# Patient Record
Sex: FEMALE | Race: WHITE | NOT HISPANIC OR LATINO | URBAN - METROPOLITAN AREA
[De-identification: names, ages, dates, MRNs, and addresses within clinical notes are randomized per-mention and may not be internally consistent; named-entity substitution may affect disease eponyms.]

---

## 2023-07-20 LAB
ABO GROUP (TYPE) IN BLOOD: NORMAL
ANTIBODY SCREEN: NORMAL
DEHYDROEPIANDROSTERONE SULFATE (DHEA-S) (UG/DL) IN SER/: 200 UG/DL (ref 12–379)
ESTIMATED AVERAGE GLUCOSE FOR HBA1C: 85 MG/DL
HEMOGLOBIN A1C/HEMOGLOBIN TOTAL IN BLOOD: 4.6 %
HEPATITIS B VIRUS SURFACE AG PRESENCE IN SERUM: NONREACTIVE
HEPATITIS C VIRUS AB PRESENCE IN SERUM: NONREACTIVE
HIV 1/ 2 AG/AB SCREEN: NONREACTIVE
PROGESTERONE (NG/ML) IN SER/PLAS: 0.3 NG/ML
PROLACTIN (UG/L) IN SER/PLAS: 19.6 UG/L (ref 3–20)
RH FACTOR: NORMAL
RUBELLA VIRUS IGG AB: POSITIVE
SYPHILIS TOTAL AB: NONREACTIVE
THYROTROPIN (MIU/L) IN SER/PLAS BY DETECTION LIMIT <= 0.05 MIU/L: 5.3 MIU/L (ref 0.44–3.98)
THYROXINE (T4) FREE (NG/DL) IN SER/PLAS: 0.68 NG/DL (ref 0.61–1.12)
VARICELLA ZOSTER IGG: POSITIVE

## 2023-07-21 LAB
CHLAMYDIA TRACH., AMPLIFIED: NEGATIVE
N. GONORRHEA, AMPLIFIED: NEGATIVE

## 2023-07-24 LAB
17-HYDROXYPROGESTERONE (REFLAB): 65.5 NG/DL
ANTI-MULLERIAN HORMONE (AMH): 8.25 NG/ML (ref 0.18–11.71)

## 2023-07-25 LAB
TESTOSTERONE FREE (CHAN): 5.4 PG/ML (ref 0.1–6.4)
TESTOSTERONE,TOTAL,LC-MS/MS: 39 NG/DL (ref 2–45)

## 2023-07-28 LAB
THYROPEROXIDASE AB (IU/ML) IN SER/PLAS: <28 IU/ML
THYROTROPIN (MIU/L) IN SER/PLAS BY DETECTION LIMIT <= 0.05 MIU/L: 4.28 MIU/L (ref 0.44–3.98)
THYROXINE (T4) FREE (NG/DL) IN SER/PLAS: 0.65 NG/DL (ref 0.61–1.12)

## 2023-08-23 PROBLEM — R53.82 CHRONIC FATIGUE: Status: ACTIVE | Noted: 2023-08-23

## 2023-08-23 PROBLEM — E03.8 SUBCLINICAL HYPOTHYROIDISM: Status: ACTIVE | Noted: 2023-08-23

## 2023-08-23 PROBLEM — R42 VERTIGO: Status: ACTIVE | Noted: 2023-08-23

## 2023-08-23 PROBLEM — N92.6 IRREGULAR MENSES: Status: ACTIVE | Noted: 2023-08-23

## 2023-08-23 PROBLEM — R11.10 INTERMITTENT VOMITING: Status: ACTIVE | Noted: 2023-08-23

## 2023-08-23 PROBLEM — N92.6 MISSED MENSES: Status: ACTIVE | Noted: 2023-08-23

## 2023-08-23 PROBLEM — N97.9 INFERTILITY, FEMALE: Status: ACTIVE | Noted: 2023-08-23

## 2023-08-23 PROBLEM — R55 PRE-SYNCOPE: Status: ACTIVE | Noted: 2023-08-23

## 2023-08-23 PROBLEM — R87.612 LOW GRADE SQUAMOUS INTRAEPITHELIAL LESION (LGSIL) ON CERVICAL PAP SMEAR: Status: ACTIVE | Noted: 2023-08-23

## 2023-08-23 RX ORDER — LEVOTHYROXINE SODIUM 50 UG/1
1 TABLET ORAL DAILY
COMMUNITY
Start: 2023-07-31 | End: 2023-12-04 | Stop reason: SDUPTHER

## 2023-08-28 ENCOUNTER — APPOINTMENT (OUTPATIENT)
Dept: PRIMARY CARE | Facility: CLINIC | Age: 33
End: 2023-08-28
Payer: COMMERCIAL

## 2023-08-31 LAB — THYROTROPIN (MIU/L) IN SER/PLAS BY DETECTION LIMIT <= 0.05 MIU/L: 1.63 MIU/L (ref 0.44–3.98)

## 2023-10-02 ENCOUNTER — DOCUMENTATION (OUTPATIENT)
Dept: ENDOCRINOLOGY | Facility: CLINIC | Age: 33
End: 2023-10-02
Payer: COMMERCIAL

## 2023-10-02 ENCOUNTER — TELEPHONE (OUTPATIENT)
Dept: ENDOCRINOLOGY | Facility: CLINIC | Age: 33
End: 2023-10-02
Payer: COMMERCIAL

## 2023-10-02 NOTE — TELEPHONE ENCOUNTER
Arvind Mullins, she called Genetics  Friday as well, and I told them they will need to tell her to schedule an appointment for me to go over the results with them as it is not simple. It can be a phone appointment though.

## 2023-10-02 NOTE — PROGRESS NOTES
Called pt via telephone. Advised pt partner's [employer] Carrier screening results are resulted. Partner Forrest is a carrier for [id number] Alport Syndrome, Biotinidase Deficiency, and Familial Dysautonomia. Pt Sugey is a carrier for [id number] Alport Syndrome. Advised pt need follow up with Genetic Counselor to understand risk for Offspring. Both carriers for a type of Alport syndrome, although not the same type may be at increased risk. Need genetic counseling to understand risk. Briefly discussed option for IVF with  PGT-M  if pt and partner are concerned with risk after genetic counseling. Advised pt DO NOT recommend conception attempts until genetic counseling is complete so that pt and partner understand risk. Pt verbalized understanding. Pt has apt with genetics this Friday, will follow up after meeting with Genetics.   MICHEAL CASH on 10/2/23 at 2:20 PM.

## 2023-10-06 ENCOUNTER — CLINICAL SUPPORT (OUTPATIENT)
Dept: GENETICS | Facility: CLINIC | Age: 33
End: 2023-10-06
Payer: COMMERCIAL

## 2023-10-06 ENCOUNTER — TELEPHONE (OUTPATIENT)
Dept: ENDOCRINOLOGY | Facility: CLINIC | Age: 33
End: 2023-10-06

## 2023-10-06 DIAGNOSIS — Z14.8 CARRIER OF GENETIC DISORDER: ICD-10-CM

## 2023-10-06 DIAGNOSIS — Z71.2 ENCOUNTER TO DISCUSS TEST RESULTS: ICD-10-CM

## 2023-10-06 PROCEDURE — 98968 PH1 ASSMT&MGMT NQHP 21-30: CPT | Performed by: GENETIC COUNSELOR, MS

## 2023-10-06 NOTE — PROGRESS NOTES
"Sugey Childers is a 33 year old, G0 female who was initially referred for preconception genetic counseling as she was recently identified as a carrier of Alport syndrome. Her , Forrest Childers, underwent carrier screening and was also identified as a carrier of this condition. Due to these results, genetic counseling was performed to discuss these results and their reproductive implications in more detail. Genetic counseling was provided via telephone. Her , Forrest Childers, participated on the call. Time spent: 57 minutes.        PAST HISTORY:  Ms. 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 Patient herself denies any personal history of hypertension, hematuria, proteinuria, or hearing loss. She states she has hypothyroidism and PCOS.    Ms. 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     Patient  -personal history of hypothyroidism, PCOS  -nephew (sister's son) with recent hospitalization due to hematuria (\"peeing blood\")   -father with history of kidney stones, retinal detachment and cataract surgery  -maternal uncle with kidney stones  -paternal first cousin once removed with possible intellectual disability/uses wheelchair    Patient's , Forrest  -Age 33  -paternal first cousin with Tuberous Sclerosis  -father with prostate cancer    The remainder of " the family history was negative for birth defects, intellectual disability, recurrent pregnancy loss, or recognized inherited conditions. Consanguinity denied.    SELF REPORTED RACE/ETHNICITY:  Patient: Gibraltarian, Lithuanian, , English  Patient's partner:      COUNSELING:    The following information was discussed with the couple today:    1. We reviewed that the couple had expanded carrier screening for 176 primarily autosomal recessive conditions. PEDRO LUIS was identified as a carrier of 1 condition (COL4A4 related Alport syndrome); while ANNA was identified as a carrier of 3 conditions (COL4A3 related Alport syndrome, biotinidase deficiency, and familial dysautonomia). The couple screened negative for all other conditions.  Negative screening significantly reduces, but does not eliminate risk to be a carrier of any one condition. Carriers generally do not have symptoms, unless otherwise specified. Other family members are at risk to also be carriers of these conditions.     2. We discussed that COL4A4 and COL4A3 related Alport syndrome is traditionally described as being inherited in an autosomal recessive fashion; however some individuals with a single COL4A4 or COL4A3 mutation will be symptomatic and can develop renal failure; therefore autosomal dominant inheritance has been described as well. There is no known association with the type of variant (missense, nonsense, splice-cite, etc) and mode of inheritance. Clinical features of autosomal recessive Alport syndrome include hematuria and proteinuria that develop in childhood that progresses to renal insufficiency and end-stage renal disease typically by the age of 30. Progressive sensorineural hearing loss (SNHL) is usually present by late childhood or early adolescence. Ocular findings include anterior lenticonus (which is virtually pathognomonic), maculopathy (whitish or yellowish flecks or granulations in the perimacular region), corneal  "endothelial vesicles (posterior polymorphous dystrophy), and recurrent corneal erosion. In individuals with autosomal dominant forms, some individuals may be asymptomatic, while others have hematuria, proteinuria, and ESRD may occur in later adulthood. SNHL is relatively late in onset, and ocular involvement is rare.     3. We previously discussed that specific COL4A4 mutation Mrs. Dasilva carries is not a confirmed pathogenic mutation and is reported by [employer] as \"likely to have a negative impact on gene function\". There is only one entry in [employer] regarding this mutation from 2017 classifying it as a variant of uncertain significance. We discussed suspicion that this may be a pathogenic mutation. As it is not commonly reported, it is uncertain if this mutation in the heterozygous state is associated with symptoms of Alport syndrome. Patient may be at risk for symptoms of the disorder. Symptomatic individuals who become pregnant are also at increased risk of increased proteinuria, renal insufficiency, worsening hypertension, and preeclampsia. For this reason, baseline and annual blood pressure screening, urinalysis to screen for hematuria/proteinuria, and renal function assessment is recommended for her.     4. We discussed that the specific COL4A3 mutation that Mr. Dasilva carries has been reported in the literature in individuals that are affected by Focal segmental glomerulosclerosis as well as Alport syndrome. Most of these affected individuals have compound heterozygous mutation in COL4A3 (a mutation in each copy of their COL4A3 gene) however some affected individuals have a single mutation in COL4A3 (consistent with autosomal dominant inheritance) and others have a single mutation in both COL4A3 and COL4A4 suggesting digenic inheritance. Therefore Mr. Dasilva himself may also be at risk of symptoms of Alport syndrome, and baseline and annual blood pressure screening, urinalysis to screen for " hematuria/proteinuria, and renal function assessment is recommended for him as well.      4.  And Mrs. Dasilva were both informed that due to reports of affected individuals with Alport syndrome that have digenic inheritance of COL4A4 and COL4A3 mutations a conception that inherits Mrs. Dasilva' COL4A4 mutation and Mr. Dasilva' COL4A3 mutation may be at risk of childhood onset Alport syndrome. Risk of a conception inheriting both of these mutation is 25%. We also discussed the 50% risk that any conception would have a single COL4A3 or COL4A4 mutation and still potentially be at risk of milder or later onset symptoms of autosomal dominant Alport syndrome. Lastly, there would be a 25% chance of any conception inheriting neither of these mutations. In summary, up to 75% of conceptions may be at risk of some form of Alport syndrome.     5. Several reproductive options may be considered for couples who are identified as being at risk of having a conception/child with a specific genetic disorder. We specifically discussed that preimplantation genetic testing for monogenic disease (PGT-M) may be considered via in vitro fertilization. If this testing were pursued, carrier testing of the couple's parents would likely be recommended, and DNA samples from multiple family members, including the couple themselves requested for probe development. We also discussed that as up to 75% of embryos may carry at least one Alport mutation, the couple may need to determine if they are open to transferring embryos that are at risk for autosomal dominant Alport syndrome (have a single mutation) as opposed to embryos that inherit both Alport mutations and are potentially at risk of earlier onset and more severe disease. We would also have to discuss with the reporting laboratory if they would report which embryos have a single v. 2 mutations. Other reproductive options could include utilizing donor egg, sperm, or embryo, spontaneous  conception with prenatal diagnostic testing, or adoption.     6. We also discussed the option, benefits, and limitations of preimplantation aneuploidy screening (PGT-A) if in vitro fertilization is pursued. This screening is designed to identify chromosomally normal (euploid) embryos which are most likely to result in ongoing pregnancy; whereas chromosomally abnormal embryos (aneuploid) are less likely to implant or result in ongoing pregnancy and may result in miscarriage, stillbirth, or liveborn with aneuploidy. Mosaicism for aneuploidy may also be detected, in which case these embryos may still be considered for transfer as they have the potential to also result in healthy ongoing pregnancies. Depending on the laboratory performing the testing, accuracy is estimated to be 98% with a resolution of 5-10MB. and does not detect all forms of chromosome abnormalities. Per [employer], rate of euploid embryos for women <35 years old is approximately 55%. If BOTH PGT-A and PGT-M are selected, this would significantly reduce the number of embryos available for transfer; however is expected to increase the odds of ongoing pregnancy without Alport syndrome with fewer transfers.     7. We also discussed that Mr. Dasilva was identified as a carrier of biotinidase deficiency as well as familial dysautonomia. The clinical features and autosomal recessive nature of these disorders were reviewed. As Mrs. Dasilva screened negative for these conditions, risk for this couple to have an affected child with biotinidase deficiency is estimated to be 1 in 54,000 and familial dysautonomia 1 in 200,000. Biotinidase deficiency is included in [place]  screening.     8. Family history risk assessment:  A. We previously discussed patient's nephews history of hematuria in the context of her carrier test results. Given his symptoms he is now scheduled for a consult with Genetics at [facility] in February for potential  Alport testing (family lives in [place]).   B. In the absence of a known genetic diagnosis for patient's relative with intellectual disability, there is no specific additional screening or testing that we would offer and accuracy of recurrence risk estimation is limited.   C. Tuberous sclerosis is an autosomal dominant condition that also has a high de patricio rate. In the absence of any other affected individuals, risk for this couple to have an affected child is expected to be low (<1%).             DISPOSITION:  The couple stated that they understood the above information. They are uncertain if they will attempt pregnancy without PGT or consider PGT-M for Alport syndrome and will discuss their options. Baseline and annual screening for symptoms of Alport syndrome is recommended as discussed above for both members of this couple.        Thank you for allowing us to participate in the care of your patient.  Should you or your patient have any questions, please do not hesitate to contact our office at [phone].      Sincerely,      Ina Aguayo MS  Licensed Genetic Counselor

## 2023-10-09 ENCOUNTER — APPOINTMENT (OUTPATIENT)
Dept: GENETICS | Facility: CLINIC | Age: 33
End: 2023-10-09
Payer: COMMERCIAL

## 2023-10-10 ENCOUNTER — TELEPHONE (OUTPATIENT)
Dept: ENDOCRINOLOGY | Facility: CLINIC | Age: 33
End: 2023-10-10
Payer: COMMERCIAL

## 2023-10-10 NOTE — TELEPHONE ENCOUNTER
LM with patient to reach out to financial team to review codes for IVF. Patient told to call back with any questions.    ARIANNE LINDER on 10/10/23 at 3:10 PM.

## 2023-10-10 NOTE — TELEPHONE ENCOUNTER
Would like cpt codes for ivf so she can check with her insurance company to see what coverage she has.

## 2023-10-16 ENCOUNTER — APPOINTMENT (OUTPATIENT)
Dept: OBSTETRICS AND GYNECOLOGY | Facility: CLINIC | Age: 33
End: 2023-10-16
Payer: COMMERCIAL

## 2023-11-14 ENCOUNTER — OFFICE VISIT (OUTPATIENT)
Dept: OBSTETRICS AND GYNECOLOGY | Facility: CLINIC | Age: 33
End: 2023-11-14
Payer: COMMERCIAL

## 2023-11-14 VITALS
WEIGHT: 130 LBS | DIASTOLIC BLOOD PRESSURE: 72 MMHG | HEIGHT: 60 IN | SYSTOLIC BLOOD PRESSURE: 108 MMHG | BODY MASS INDEX: 25.52 KG/M2

## 2023-11-14 DIAGNOSIS — N97.9 INFERTILITY, FEMALE: ICD-10-CM

## 2023-11-14 DIAGNOSIS — Z01.419 WELL WOMAN EXAM WITH ROUTINE GYNECOLOGICAL EXAM: Primary | ICD-10-CM

## 2023-11-14 DIAGNOSIS — B97.7 HPV IN FEMALE: ICD-10-CM

## 2023-11-14 PROCEDURE — 1036F TOBACCO NON-USER: CPT | Performed by: ADVANCED PRACTICE MIDWIFE

## 2023-11-14 PROCEDURE — 88141 CYTOPATH C/V INTERPRET: CPT | Performed by: STUDENT IN AN ORGANIZED HEALTH CARE EDUCATION/TRAINING PROGRAM

## 2023-11-14 PROCEDURE — 87624 HPV HI-RISK TYP POOLED RSLT: CPT

## 2023-11-14 PROCEDURE — 88175 CYTOPATH C/V AUTO FLUID REDO: CPT

## 2023-11-14 PROCEDURE — 99395 PREV VISIT EST AGE 18-39: CPT | Performed by: ADVANCED PRACTICE MIDWIFE

## 2023-11-14 ASSESSMENT — ENCOUNTER SYMPTOMS
EYES NEGATIVE: 0
CARDIOVASCULAR NEGATIVE: 0
PSYCHIATRIC NEGATIVE: 0
ALLERGIC/IMMUNOLOGIC NEGATIVE: 0
GASTROINTESTINAL NEGATIVE: 0
MUSCULOSKELETAL NEGATIVE: 0
RESPIRATORY NEGATIVE: 0
CONSTITUTIONAL NEGATIVE: 0
ENDOCRINE NEGATIVE: 0
NEUROLOGICAL NEGATIVE: 0
HEMATOLOGIC/LYMPHATIC NEGATIVE: 0

## 2023-11-28 NOTE — PROGRESS NOTES
Subjective   Sugey Dasilva is a 33 y.o. female who is here for an annual gyn exam.     Periods are regular every 28-30 days, lasting 3 days. Dysmenorrhea:none. Cyclic symptoms include none. No intermenstrual bleeding, spotting, or discharge.  Current contraception: none - Infertility, plans IVF  History of abnormal Pap smear: yes - 9/19/22 Normal HPV + (OTHER)  Family history of uterine or ovarian cancer: no  History of abnormal mammogram: N/A  Family history of breast cancer: no    Review of Systems    Objective   /72 (BP Location: Left arm, Patient Position: Sitting, BP Cuff Size: Adult)   Ht 1.524 m (5')   Wt 59 kg (130 lb)   LMP 11/10/2023 (Exact Date)   BMI 25.39 kg/m²   Physical Exam  Constitutional:       General: She is not in acute distress.  Cardiovascular:      Rate and Rhythm: Normal rate and regular rhythm.      Heart sounds: Normal heart sounds.   Pulmonary:      Effort: Pulmonary effort is normal.      Breath sounds: Normal breath sounds.   Chest:      Chest wall: No deformity, swelling or tenderness.   Breasts:     Right: Normal.      Left: Normal.   Abdominal:      Palpations: Abdomen is soft. There is no mass.      Tenderness: There is no abdominal tenderness.   Genitourinary:     General: Normal vulva.      Vagina: No vaginal discharge, erythema, tenderness, bleeding or lesions.      Cervix: No cervical motion tenderness, discharge, friability, lesion or cervical bleeding.      Uterus: Normal. Not enlarged, not fixed and not tender.       Adnexa: Right adnexa normal and left adnexa normal.        Right: No mass, tenderness or fullness.          Left: No mass, tenderness or fullness.        Rectum: Normal. No anal fissure or external hemorrhoid.   Lymphadenopathy:      Upper Body:      Right upper body: No supraclavicular or axillary adenopathy.      Left upper body: No supraclavicular or axillary adenopathy.   Neurological:      Mental Status: She is alert.          Assessment/Plan:  33 y.o. yo woman for annual GYN exam    1) Health maintenance:   Pap guidelines discussed (ASCCP). PAP cotesting  Self breast exam encouraged - mammograms annually starting at age 40.   Diet and exercise reviewed.  Routine follow up with PCP for health maintenance examination encouraged including TSH, cholesterol and vitamin D evaluation.    2) Infertility  Working with , also has consult w/ [facility]     3) HPV+  Discussed and repeat PAP cotesting today    Sugey was seen today for well women visit.  Diagnoses and all orders for this visit:  Well woman exam with routine gynecological exam  -     THINPREP PAP  -     HPV DNA High Risk With Genotype  Infertility, female  HPV in female     F/U 1 year or as needed

## 2023-12-01 ENCOUNTER — TELEPHONE (OUTPATIENT)
Dept: OBSTETRICS AND GYNECOLOGY | Facility: CLINIC | Age: 33
End: 2023-12-01
Payer: COMMERCIAL

## 2023-12-01 NOTE — TELEPHONE ENCOUNTER
Pt has a clinic in [place] they use for IVF. Pt is wondering if they are able to have follow up ultrasounds/care here in between their [place] appts if needed. Pt indicated they spoke with you a little about this already. Please advise. Thanks

## 2023-12-04 ENCOUNTER — TELEPHONE (OUTPATIENT)
Dept: ENDOCRINOLOGY | Facility: CLINIC | Age: 33
End: 2023-12-04
Payer: COMMERCIAL

## 2023-12-04 DIAGNOSIS — E03.8 SUBCLINICAL HYPOTHYROIDISM: Primary | ICD-10-CM

## 2023-12-04 NOTE — TELEPHONE ENCOUNTER
Called patient back and left voicemail. Order sent to Rema Reyes to sign off levothyroxine refill and let patient know to get repeat tsh done within the next few weeks because she hasn't gotten one done since August. Patient to call when she gets this done at a  lab so that we can follow up with result. Pt to call back with any questions or if she has any trouble getting her prescription.     AIDA MELENDEZ on 12/4/23 at 2:36 PM.

## 2023-12-05 RX ORDER — LEVOTHYROXINE SODIUM 50 UG/1
50 TABLET ORAL DAILY
Qty: 30 TABLET | Refills: 1 | Status: SHIPPED | OUTPATIENT
Start: 2023-12-05 | End: 2024-02-07 | Stop reason: SDUPTHER

## 2023-12-05 NOTE — TELEPHONE ENCOUNTER
Pt seeking IVF through [facility] in [place]  Would she have co-care through us or [facility] here (she has met with the NP). Is established with our practice.  ASHLIE is thorough and provides detailed instructions in my experience.

## 2023-12-06 LAB
CYTOLOGY CMNT CVX/VAG CYTO-IMP: NORMAL
HPV HR 12 DNA GENITAL QL NAA+PROBE: POSITIVE
HPV HR GENOTYPES PNL CVX NAA+PROBE: POSITIVE
HPV16 DNA SPEC QL NAA+PROBE: NEGATIVE
HPV18 DNA SPEC QL NAA+PROBE: NEGATIVE
LAB AP HPV GENOTYPE QUESTION: YES
LAB AP HPV HR: NORMAL
LABORATORY COMMENT REPORT: NORMAL
PATH REPORT.TOTAL CANCER: NORMAL

## 2023-12-11 ENCOUNTER — TELEPHONE (OUTPATIENT)
Dept: ENDOCRINOLOGY | Facility: CLINIC | Age: 33
End: 2023-12-11
Payer: COMMERCIAL

## 2023-12-11 DIAGNOSIS — Z13.29 SCREENING FOR THYROID DISORDER: Primary | ICD-10-CM

## 2023-12-11 NOTE — TELEPHONE ENCOUNTER
Returned patient's call. Patient was started on Synthroid around July, and had TSH drawn in August. Patient was wondering how often she would need to repeat this blood work. Patient aware we wanted to monitor her levels since it has not been drawn since August to make sure this dose is still appropriate. Patient aware to have repeat drawn this week, and will call the office when she gets it done so we can look out for results.    ARIANNE LINDER on 12/11/23 at 10:41 AM.

## 2023-12-14 NOTE — TELEPHONE ENCOUNTER
Patient calling to see if she needs a biopsy following her pap and wants provider to give her a call

## 2023-12-15 NOTE — TELEPHONE ENCOUNTER
Spoke with patient and relayed information regarding [facility] and inability to provide cocare at this time along with recommended providers for her to try    Discussed PAP and recommended colposcopy    Please have pt scheduled for colposcopy

## 2023-12-18 NOTE — TELEPHONE ENCOUNTER
Patient called back and scheduled with Raquel on 1/4 in [place]. She would like a call with more information on the actual procedure

## 2023-12-28 ENCOUNTER — APPOINTMENT (OUTPATIENT)
Dept: INTEGRATIVE MEDICINE | Facility: CLINIC | Age: 33
End: 2023-12-28
Payer: COMMERCIAL

## 2024-01-04 ENCOUNTER — OFFICE VISIT (OUTPATIENT)
Dept: OBSTETRICS AND GYNECOLOGY | Facility: CLINIC | Age: 34
End: 2024-01-04
Payer: COMMERCIAL

## 2024-01-04 VITALS
DIASTOLIC BLOOD PRESSURE: 72 MMHG | SYSTOLIC BLOOD PRESSURE: 126 MMHG | WEIGHT: 127.8 LBS | HEIGHT: 62 IN | BODY MASS INDEX: 23.52 KG/M2

## 2024-01-04 DIAGNOSIS — R87.612 LGSIL ON PAP SMEAR OF CERVIX: Primary | ICD-10-CM

## 2024-01-04 LAB — PREGNANCY TEST URINE, POC: NEGATIVE

## 2024-01-04 PROCEDURE — 1036F TOBACCO NON-USER: CPT | Performed by: ADVANCED PRACTICE MIDWIFE

## 2024-01-04 PROCEDURE — 57454 BX/CURETT OF CERVIX W/SCOPE: CPT | Performed by: ADVANCED PRACTICE MIDWIFE

## 2024-01-04 PROCEDURE — 88305 TISSUE EXAM BY PATHOLOGIST: CPT

## 2024-01-04 PROCEDURE — 88305 TISSUE EXAM BY PATHOLOGIST: CPT | Performed by: STUDENT IN AN ORGANIZED HEALTH CARE EDUCATION/TRAINING PROGRAM

## 2024-01-04 PROCEDURE — 81025 URINE PREGNANCY TEST: CPT | Performed by: ADVANCED PRACTICE MIDWIFE

## 2024-01-04 NOTE — PROGRESS NOTES
Patient ID: Sugey Dasilva is a 33 y.o. female.  This is patients Coloposcopy.  LMP: 12/11/2023  PAP: 11/14/2023 LGSIL HPV pos(other)  UPT: JEM Dominguez    Colposcopy    Date/Time: 1/4/2024 9:17 AM    Performed by: LYNN Bynum  Authorized by: LYNN Bynum    Procedure location: cervix and vagina    Consent:     Patient questions answered: yes      Risks and benefits of the procedure and its alternatives discussed: yes      Procedural risks discussed:  Bleeding and infection    Consent obtained:  Verbal and written    Consent given by:  Patient  Indication:     Cervical indication(s): cervical LSIL    Pre-procedure:     Speculum was placed in the vagina: yes      Prep solution(s): acetic acid      Local anesthetic:  Benzocaine spray  Procedure:     Colposcopy with: cervical biopsy and endocervical curettage      Biopsy taken: yes      # of biopsies:  4    Biopsy location(s): 2, 5, 7, 10    Cervix visibility: fully visualized      SCJ visibility: fully visualized      Lesion visualized: fully visualized      Acetowhite lesion(s): cervix      Cervical impression: low grade      Vaginal impression: normal/benign      Ferric subsulfate solution applied: yes      Tampon inserted: no    Post-procedure:     Patient tolerance of procedure:  Patient tolerated the procedure well with no immediate complications    Instructions and paperwork completed: yes      Educational handouts given: yes      LYNN Bynum

## 2024-01-08 LAB
LABORATORY COMMENT REPORT: NORMAL
PATH REPORT.FINAL DX SPEC: NORMAL
PATH REPORT.GROSS SPEC: NORMAL
PATH REPORT.RELEVANT HX SPEC: NORMAL
PATH REPORT.TOTAL CANCER: NORMAL

## 2024-01-15 ENCOUNTER — ALLIED HEALTH (OUTPATIENT)
Dept: INTEGRATIVE MEDICINE | Facility: CLINIC | Age: 34
End: 2024-01-15
Payer: COMMERCIAL

## 2024-01-15 DIAGNOSIS — N97.9 INFERTILITY, FEMALE: ICD-10-CM

## 2024-01-15 DIAGNOSIS — M25.561 CHRONIC PAIN OF RIGHT KNEE: Primary | ICD-10-CM

## 2024-01-15 DIAGNOSIS — G89.29 CHRONIC PAIN OF RIGHT KNEE: Primary | ICD-10-CM

## 2024-01-15 DIAGNOSIS — N92.6 IRREGULAR MENSES: ICD-10-CM

## 2024-01-15 PROCEDURE — 97810 ACUP 1/> WO ESTIM 1ST 15 MIN: CPT

## 2024-01-15 PROCEDURE — 97811 ACUP 1/> W/O ESTIM EA ADD 15: CPT

## 2024-01-15 SDOH — ECONOMIC STABILITY: FOOD INSECURITY: WITHIN THE PAST 12 MONTHS, THE FOOD YOU BOUGHT JUST DIDN'T LAST AND YOU DIDN'T HAVE MONEY TO GET MORE.: NEVER TRUE

## 2024-01-15 SDOH — SOCIAL STABILITY: SOCIAL NETWORK: I HAVE TROUBLE DOING ALL OF MY USUAL WORK (INCLUDE WORK AT HOME): NEVER

## 2024-01-15 SDOH — ECONOMIC STABILITY: FOOD INSECURITY: WITHIN THE PAST 12 MONTHS, YOU WORRIED THAT YOUR FOOD WOULD RUN OUT BEFORE YOU GOT MONEY TO BUY MORE.: NEVER TRUE

## 2024-01-15 SDOH — SOCIAL STABILITY: SOCIAL NETWORK: I HAVE TROUBLE DOING ALL OF THE ACTIVITIES WITH FRIENDS THAT I WANT TO DO: NEVER

## 2024-01-15 SDOH — SOCIAL STABILITY: SOCIAL NETWORK: I HAVE TROUBLE DOING ALL OF MY REGULAR LEISURE ACTIVITIES WITH OTHERS: NEVER

## 2024-01-15 SDOH — SOCIAL STABILITY: SOCIAL NETWORK: I HAVE TROUBLE DOING ALL OF THE FAMILY ACTIVITIES THAT I WANT TO DO: NEVER

## 2024-01-15 SDOH — SOCIAL STABILITY: SOCIAL NETWORK

## 2024-01-15 SDOH — SOCIAL STABILITY: SOCIAL NETWORK: PROMIS ABILITY TO PARTICIPATE IN SOCIAL ROLES & ACTIVITIES T-SCORE: 64

## 2024-01-15 ASSESSMENT — PROMIS GLOBAL HEALTH SCALE
RATE_GENERAL_HEALTH: VERY GOOD
RATE_PHYSICAL_HEALTH: GOOD
EMOTIONAL_PROBLEMS: NEVER
RATE_MENTAL_HEALTH: VERY GOOD
RATE_AVERAGE_PAIN: 2
RATE_QUALITY_OF_LIFE: VERY GOOD
CARRYOUT_SOCIAL_ACTIVITIES: VERY GOOD
CARRYOUT_PHYSICAL_ACTIVITIES: COMPLETELY
RATE_SOCIAL_SATISFACTION: VERY GOOD

## 2024-01-15 ASSESSMENT — ANXIETY QUESTIONNAIRES
PAST MONTH HOW OFTEN HAVE YOU FELT DIFFICULTIES WERE PILING UP SO HIGH THAT YOU COULD NOT OVERCOME THEM: 2 - SOMETIMES
PAST MONTH HOW OFTEN HAVE YOU FELT THAT THINGS WERE GOING YOUR WAY: 2 - SOMETIMES
PAST MONTH HOW OFTEN HAVE YOU FELT CONFIDENT ABOUT YOUR ABILITY TO HANDLE YOUR PROBLEMS: 3 - FAIRLY OFTEN
PAST MONTH HOW OFTEN HAVE YOU FELT CONFIDENT ABOUT YOUR ABILITY TO HANDLE YOUR PROBLEMS: 3 - FAIRLY OFTEN
PAST MONTH HOW OFTEN HAVE YOU FELT THAT YOU WERE UNABLE TO CONTROL THE IMPORTANT THINGS IN YOUR LIFE: 2 - SOMETIMES
PAST MONTH HOW OFTEN HAVE YOU FELT THAT YOU WERE UNABLE TO CONTROL THE IMPORTANT THINGS IN YOUR LIFE: 2 - SOMETIMES

## 2024-01-15 NOTE — PROGRESS NOTES
Acupuncture Visit:     Subjective   Patient ID: Sugey Dasilva is a 33 y.o. female who presents for No chief complaint on file.    Initial Visit: 01/15/24    MC: Chronic Pain and Fertility Support    Chronic pain:  Knee Pain - right ACL surgery in 2019 prior to that she tore 4 ligaments in the right knee.  9 months prior partially tore ACL.  Pain is worse on the right side.  Pain feels dull and is focused below the knee joint.    Sciatica - from high school softball. Rare occasions experience sharp pain in her low back.  Headaches - weekly particularly on the forehead, vertex and occiput.  Feels like tension, throbbing and pulsing.  Normally takes Aleve or drink water.    Currently working with [facility] in [place] for IVF after TTC for over a year; started trying in May of 2022.  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  Hx of PCOS dx and hypothyroidism.    Hx of Menses:  LMP - Currently on day 3 of period  12 3/28-35  Periods were regular during her teens then got irregular for a while and is now starting to return to normal     Period:  Day 1 - very light , change about 4-5 tampons, 1 or 2 are usually soaked but others are just regular changes  Day 2 - medium flow and tends to be the heaviest day  Day 3 -  light into spotting (rare to have a day 4)  Viscosity - like normal blood flow  Color -  mix of bright red and dark red like strawberry jam  Clotting - dime size with every period  PMS - headache, stomach ache, bloating, breast tenderness, sleep issues with trouble falling asleep  Temperature - even temp     Diet and Lifestyle:  Diet - vegan since 2021- breakfast - oatmeal, flax seeds            lunch - salad or bagel and fruit bar            Dinner - rice, tofu, eating eggs due to IVF  Exercise - usually yoga, was going weekly but schedule change; summer plays volleyball; walks  dog  Lifestyle: red light therapy  Sleep - normally good, melatonin helps with falling asleep and is normally able to sleep for 8-9hrs without disruption  Digestion - good now but used to be poor with frequent throwing up  BM - regular, formed and easy to pass     Supplements:  Ovarian Bloom from [facility]  Vitamin E  Prenatal  Ashwaganda  Curcumin  Melatonin 5mg    Medications:  Levothyroxyn          Session Information  Is this acupuncture treatment being billed to the patient's insurance company: Yes  This is visit number: 1  The patient has a total number of visits of: 20  Initial Acupuncture Treatment date: 01/15/24  Name of Insurance Company: [employer]  Visit Type: New patient  Medical History Reviewed: I have reviewed pertinent medical history in EHR, and no contraindications are present to provide treatment         Review of Systems         Provider reviewed plan for the acupuncture session, precautions and contraindications. Patient/guardian/hospital staff has given consent to treat with full understanding of what to expect during the session. Before acupuncture began, provider explained to the patient to communicate at any time if the procedure was causing discomfort past their tolerance level. Patient agreed to advise acupuncturist. The acupuncturist counseled the patient on the risks of acupuncture treatment including pain, infection, bleeding, and no relief of pain. The patient was positioned comfortably. There was no evidence of infection at the site of needle insertions.    Objective   Physical Exam    Acupuncture Physical Exam  Tongue Color: Red tip  Tongue Shape: Large, Midline cracks  Tongue Coating: Thin yellow  Pulse: Wiry, Thin    Treatment Plan  Treatment Goals: Pain management, Wellbeing improvement, Stress reduction, Relaxation    Acupuncture Treatment  Patient Position: Supine on a table  Acupuncture Needling: Yes  Needle Guage: 36 guage /.20/ Blue seirin  Body Points: With  retention  Body Points - Left: PC6; KD6  Body Points - Bilateral: 4 ocononr; SP6; ST36; GV20  Body Points - Right: SP4; LU7; xiyan; ST35; GB34  Auricular Points: No  Electroacupuncture Used: No  Other Techniques Utilized: Ear seeds  Ear Seeds: Stainless steel (Philip)  Needle Count In: 16  Needle Count Out: 16  Needle Retention Time (min): 25 minutes  Total Face to Face Time (min): 30 minutes              Assessment/Plan

## 2024-02-07 ENCOUNTER — TELEPHONE (OUTPATIENT)
Dept: ENDOCRINOLOGY | Facility: CLINIC | Age: 34
End: 2024-02-07
Payer: COMMERCIAL

## 2024-02-07 DIAGNOSIS — E03.8 SUBCLINICAL HYPOTHYROIDISM: ICD-10-CM

## 2024-02-07 RX ORDER — LEVOTHYROXINE SODIUM 50 UG/1
50 TABLET ORAL DAILY
Qty: 30 TABLET | Refills: 0 | Status: SHIPPED | OUTPATIENT
Start: 2024-02-07

## 2024-02-07 NOTE — TELEPHONE ENCOUNTER
Returned patient's call, no answer, detailed VM left that patient needs to repeat a TSH level and an order was placed for that blood work in December. Advised patient to have repeat level done today or tomorrow and to call the office after having blood drawn. Explained that result would be reviewed by a NP to determine if any changes to her dosage is needed and a refill would be placed after that review was done.  ALFONSO EDWARD on 2/7/24 at 11:07 AM.

## 2024-02-09 ENCOUNTER — LAB (OUTPATIENT)
Dept: LAB | Facility: LAB | Age: 34
End: 2024-02-09
Payer: COMMERCIAL

## 2024-02-09 DIAGNOSIS — Z13.29 SCREENING FOR THYROID DISORDER: ICD-10-CM

## 2024-02-09 LAB — TSH SERPL-ACNC: 1.32 MIU/L (ref 0.44–3.98)

## 2024-02-09 PROCEDURE — 84443 ASSAY THYROID STIM HORMONE: CPT

## 2024-02-09 PROCEDURE — 36415 COLL VENOUS BLD VENIPUNCTURE: CPT

## 2024-02-15 ENCOUNTER — ALLIED HEALTH (OUTPATIENT)
Dept: INTEGRATIVE MEDICINE | Facility: CLINIC | Age: 34
End: 2024-02-15
Payer: COMMERCIAL

## 2024-02-15 DIAGNOSIS — N97.9 INFERTILITY, FEMALE: ICD-10-CM

## 2024-02-15 DIAGNOSIS — N92.6 IRREGULAR MENSES: ICD-10-CM

## 2024-02-15 DIAGNOSIS — M25.561 CHRONIC PAIN OF RIGHT KNEE: Primary | ICD-10-CM

## 2024-02-15 DIAGNOSIS — G89.29 CHRONIC PAIN OF RIGHT KNEE: Primary | ICD-10-CM

## 2024-02-15 PROCEDURE — 97810 ACUP 1/> WO ESTIM 1ST 15 MIN: CPT

## 2024-02-15 PROCEDURE — 97811 ACUP 1/> W/O ESTIM EA ADD 15: CPT

## 2024-02-15 NOTE — PROGRESS NOTES
Acupuncture Visit:     Subjective   Patient ID: Sugey Dasilva is a 33 y.o. female who presents for Knee Pain and Fertility Support    Update: 02/15/24  Tx 2/20    Moving right along with her plans for IVF.  Will be starting treatments in the next month.    LMP 02/10/24 and ended on the 13th.  Period started early but still within range of normal cycle, just a couple of days early.  Had a headache on Monday.  Did not experience her usual cramping but did have a headache on Monday.  Life has been manageable so far, no feeling of stress but notice she feels just a little nervous about the process starting in a month.  She was stressed on Friday trying to figure out a few things regarding monitoring especially since she is working with a clinic in [place].    Initial Visit: 01/15/24    MC: Chronic Pain and Fertility Support    Chronic pain:  Knee Pain - right ACL surgery in 2019 prior to that she tore 4 ligaments in the right knee.  9 months prior partially tore ACL.  Pain is worse on the right side.  Pain feels dull and is focused below the knee joint.    Sciatica - from high school softball. Rare occasions experience sharp pain in her low back.  Headaches - weekly particularly on the forehead, vertex and occiput.  Feels like tension, throbbing and pulsing.  Normally takes Aleve or drink water.    Currently working with [facility] in [place] for IVF after TTC for over a year; started trying in May of 2022.  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  Hx of PCOS dx and hypothyroidism.    Hx of Menses:  LMP - Currently on day 3 of period  12 3/28-35  Periods were regular during her teens then got irregular for a while and is now starting to return to normal     Period:  Day 1 - very light , change about 4-5 tampons, 1 or 2 are usually soaked but others are just regular changes  Day 2 - medium flow  and tends to be the heaviest day  Day 3 -  light into spotting (rare to have a day 4)  Viscosity - like normal blood flow  Color -  mix of bright red and dark red like strawberry jam  Clotting - dime size with every period  PMS - headache, stomach ache, bloating, breast tenderness, sleep issues with trouble falling asleep  Temperature - even temp     Diet and Lifestyle:  Diet - vegan since 2021- breakfast - oatmeal, flax seeds            lunch - salad or bagel and fruit bar            Dinner - rice, tofu, eating eggs due to IVF  Exercise - usually yoga, was going weekly but schedule change; summer plays volleyball; walks dog  Lifestyle: red light therapy  Sleep - normally good, melatonin helps with falling asleep and is normally able to sleep for 8-9hrs without disruption  Digestion - good now but used to be poor with frequent throwing up  BM - regular, formed and easy to pass     Supplements:  Ovarian Bloom from [facility]  Vitamin E  Prenatal  Ashwaganda  Curcumin  Melatonin 5mg    Medications:  Levothyroxyn          Session Information  Is this acupuncture treatment being billed to the patient's insurance company: Yes  This is visit number: 2  The patient has a total number of visits of: 20  Initial Acupuncture Treatment date: 01/15/24  Last Treatment date: 01/15/24  Name of Insurance Company: [employer]  Visit Type: Follow-up visit  Medical History Reviewed: I have reviewed pertinent medical history in EHR, and no contraindications are present to provide treatment         Review of Systems         Provider reviewed plan for the acupuncture session, precautions and contraindications. Patient/guardian/hospital staff has given consent to treat with full understanding of what to expect during the session. Before acupuncture began, provider explained to the patient to communicate at any time if the procedure was causing discomfort past their tolerance level. Patient agreed to advise acupuncturist. The  acupuncturist counseled the patient on the risks of acupuncture treatment including pain, infection, bleeding, and no relief of pain. The patient was positioned comfortably. There was no evidence of infection at the site of needle insertions.    Objective   Physical Exam         Treatment Plan  Treatment Goals: Wellbeing improvement, Pain management, Relaxation    Acupuncture Treatment  Patient Position: Supine on a table  Acupuncture Needling: Yes  Needle Guage: 36 guage /.20/ Blue seirin  Body Points: With retention  Body Points - Left: KD6; PC6  Body Points - Bilateral: ST36; SP6; LV3,8; GV20  Body Points - Right: LU7; SP4  Auricular Points: No  Electroacupuncture Used: No  Other Techniques Utilized: TDP Lamp  TDP Lamp Descripton: 20mins on feet  Needle Count In: 13  Needle Count Out: 13  Needle Retention Time (min): 30 minutes  Total Face to Face Time (min): 30 minutes              Assessment/Plan

## 2024-03-05 ENCOUNTER — ALLIED HEALTH (OUTPATIENT)
Dept: INTEGRATIVE MEDICINE | Facility: CLINIC | Age: 34
End: 2024-03-05
Payer: COMMERCIAL

## 2024-03-05 DIAGNOSIS — N92.6 IRREGULAR MENSES: ICD-10-CM

## 2024-03-05 DIAGNOSIS — M25.561 CHRONIC PAIN OF RIGHT KNEE: Primary | ICD-10-CM

## 2024-03-05 DIAGNOSIS — N97.9 INFERTILITY, FEMALE: ICD-10-CM

## 2024-03-05 DIAGNOSIS — G89.29 CHRONIC PAIN OF RIGHT KNEE: Primary | ICD-10-CM

## 2024-03-05 PROCEDURE — 97811 ACUP 1/> W/O ESTIM EA ADD 15: CPT

## 2024-03-05 PROCEDURE — 97810 ACUP 1/> WO ESTIM 1ST 15 MIN: CPT

## 2024-03-05 NOTE — PROGRESS NOTES
Acupuncture Visit:     Subjective   Patient ID: Sugey Dasilva is a 33 y.o. female who presents for Fertility Support and Knee Pain    Update: 03/05/24  Tx 3/20    Feeling tired at the moment as she just woke up an hour ago.      Knees feel good today.  No headaches this week so far.    She will be starting the medication process of her IVF journey sometime this month.    Currently on CD25.  Feeling slightly bloated as she approaches her period.    Update: 02/15/24  Tx 2/20    Moving right along with her plans for IVF.  Will be starting treatments in the next month.    LMP 02/10/24 and ended on the 13th.  Period started early but still within range of normal cycle, just a couple of days early.  Had a headache on Monday.  Did not experience her usual cramping but did have a headache on Monday.  Life has been manageable so far, no feeling of stress but notice she feels just a little nervous about the process starting in a month.  She was stressed on Friday trying to figure out a few things regarding monitoring especially since she is working with a clinic in [place].    Initial Visit: 01/15/24    MC: Chronic Pain and Fertility Support    Chronic pain:  Knee Pain - right ACL surgery in 2019 prior to that she tore 4 ligaments in the right knee.  9 months prior partially tore ACL.  Pain is worse on the right side.  Pain feels dull and is focused below the knee joint.    Sciatica - from high school softball. Rare occasions experience sharp pain in her low back.  Headaches - weekly particularly on the forehead, vertex and occiput.  Feels like tension, throbbing and pulsing.  Normally takes Aleve or drink water.    Currently working with [facility] in [place] for IVF after TTC for over a year; started trying in May of 2022.  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  Hx of PCOS dx and  hypothyroidism.    Hx of Menses:  LMP - Currently on day 3 of period  12 3/28-35  Periods were regular during her teens then got irregular for a while and is now starting to return to normal     Period:  Day 1 - very light , change about 4-5 tampons, 1 or 2 are usually soaked but others are just regular changes  Day 2 - medium flow and tends to be the heaviest day  Day 3 -  light into spotting (rare to have a day 4)  Viscosity - like normal blood flow  Color -  mix of bright red and dark red like strawberry jam  Clotting - dime size with every period  PMS - headache, stomach ache, bloating, breast tenderness, sleep issues with trouble falling asleep  Temperature - even temp     Diet and Lifestyle:  Diet - vegan since 2021- breakfast - oatmeal, flax seeds            lunch - salad or bagel and fruit bar            Dinner - rice, tofu, eating eggs due to IVF  Exercise - usually yoga, was going weekly but schedule change; summer plays volleyball; walks dog  Lifestyle: red light therapy  Sleep - normally good, melatonin helps with falling asleep and is normally able to sleep for 8-9hrs without disruption  Digestion - good now but used to be poor with frequent throwing up  BM - regular, formed and easy to pass     Supplements:  Ovarian Bloom from [facility]  Vitamin E  Prenatal  Ashwaganda  Curcumin  Melatonin 5mg    Medications:  Levothyroxyn          Session Information  Is this acupuncture treatment being billed to the patient's insurance company: Yes  This is visit number: 3  The patient has a total number of visits of: 20  Initial Acupuncture Treatment date: 01/15/24  Last Treatment date: 02/15/24  Name of Insurance Company: [facility]  Visit Type: Follow-up visit  Medical History Reviewed: I have reviewed pertinent medical history in EHR, and no contraindications are present to provide treatment         Review of Systems         Provider reviewed plan for the acupuncture session, precautions and  contraindications. Patient/guardian/hospital staff has given consent to treat with full understanding of what to expect during the session. Before acupuncture began, provider explained to the patient to communicate at any time if the procedure was causing discomfort past their tolerance level. Patient agreed to advise acupuncturist. The acupuncturist counseled the patient on the risks of acupuncture treatment including pain, infection, bleeding, and no relief of pain. The patient was positioned comfortably. There was no evidence of infection at the site of needle insertions.    Objective   Physical Exam         Treatment Plan  Treatment Goals: Wellbeing improvement, Relaxation    Acupuncture Treatment  Patient Position: Supine on a table  Acupuncture Needling: Yes  Needle Guage: 36 guage /.20/ Blue seirin  Body Points: With retention  Body Points - Bilateral: 4 oconnor; SP6,10; ST36  Auricular Points: Yes  Auricular Points - Bilateral: [place]men  Electroacupuncture Used: No  Other Techniques Utilized: TDP Lamp  TDP Lamp Descripton: 20mins on feet  Needle Count In: 12  Needle Count Out: 12  Needle Retention Time (min): 30 minutes  Total Face to Face Time (min): 30 minutes              Assessment/Plan

## 2024-03-11 ENCOUNTER — TELEPHONE (OUTPATIENT)
Dept: RADIOLOGY | Facility: CLINIC | Age: 34
End: 2024-03-11
Payer: COMMERCIAL

## 2024-03-11 NOTE — TELEPHONE ENCOUNTER
FABBY with patient to call office back. Patient sent message in December that she was planning on transferring care to [facility] fertility, and want to confirm that they are managing TSH.      ARIANNE LINDER on 3/11/24 at 3:04 PM.

## 2024-03-11 NOTE — TELEPHONE ENCOUNTER
JAKE PT     PT is calling in regards to needing a refill on her levothyroxine. She also has questions about the amount that can be sent in at once.

## 2024-03-12 ENCOUNTER — TELEPHONE (OUTPATIENT)
Dept: OBSTETRICS AND GYNECOLOGY | Facility: CLINIC | Age: 34
End: 2024-03-12
Payer: COMMERCIAL

## 2024-03-12 ENCOUNTER — TELEPHONE (OUTPATIENT)
Dept: RADIOLOGY | Facility: CLINIC | Age: 34
End: 2024-03-12
Payer: COMMERCIAL

## 2024-03-12 DIAGNOSIS — E03.8 SUBCLINICAL HYPOTHYROIDISM: Primary | ICD-10-CM

## 2024-03-12 RX ORDER — LEVOTHYROXINE SODIUM 50 UG/1
50 TABLET ORAL DAILY
Qty: 30 TABLET | Refills: 0 | Status: SHIPPED | OUTPATIENT
Start: 2024-03-12 | End: 2025-03-12

## 2024-03-12 NOTE — TELEPHONE ENCOUNTER
TC returned to pt - states that she is going to be doing IVF with a clinic in [place]; she is out of her Levothyroxine and asking for a refill. Last TSH was 1.32 on Feb 9th; pt states that she is currently on 50 mcg and has been since September. Advised to pt that RN will d/w Rema re: refill, but pt technically not under our care as she is seeking fertility tx elsewhere and she will need to find another provider locally to manage her thyroid - states that her PCP does not manage thyroid meds.  She is asking for 3 month supply to give her time to find a provider.   RN will get back to pt after hearing from Rema.  Sariah Evans 03/12/24 10:41 AM    TC to pt after speaking with Rema Beck LM -  [facility] will refill the RX for one month; pt will need to find another provider to follow up with management of her thyroid.  Sariah Evans 03/12/24 12:05 PM       Warm/Dry

## 2024-03-12 NOTE — TELEPHONE ENCOUNTER
I called and spoke with the patient. I advised her if she was pregnant that Dr. Morrison would but go through the fertility clinic for Rx currently.

## 2024-03-14 ENCOUNTER — TELEPHONE (OUTPATIENT)
Dept: ENDOCRINOLOGY | Facility: CLINIC | Age: 34
End: 2024-03-14
Payer: COMMERCIAL

## 2024-03-14 NOTE — TELEPHONE ENCOUNTER
Patient calling back because she wants to know why her synthroid therapy would have to be discontinued. She is currently doing [facility] and was told by [facility] that they will not monitor her thyroid levels. Her current obgyn also said they will not monitor and prescribe synthroid until she is pregnant. Patient states PCP also told her that they would not manage her thyroid. Patient wants to know if she can at least still have her thyroid managed through  until she is able to get a referral and an appointment with an endocrinologist. Will speak with Rema to see what recommendation is for thyroid management and treatment.   AIDA MELENDEZ on 3/14/24 at 10:37 AM.    Message sent to Rema and will call patient with plan once a recommendation is given.   AIDA MELENDEZ on 3/14/24 at 11:03 AM.

## 2024-05-14 ENCOUNTER — TELEPHONE (OUTPATIENT)
Dept: ENDOCRINOLOGY | Facility: CLINIC | Age: 34
End: 2024-05-14
Payer: COMMERCIAL

## 2024-05-14 DIAGNOSIS — E03.9 HYPOTHYROIDISM, UNSPECIFIED TYPE: Primary | ICD-10-CM

## 2024-05-14 NOTE — TELEPHONE ENCOUNTER
Called patient and LM to let her know Rema Reyes placed referral for endocrinologist. Patient was last seen in August 2023, and is no longer proceeding with fertility treatments with , and wanted us to manage her thyroids. Patient was made aware previously that since we are not treating her for fertility, and she was being treated elsewhere, she would need to have thyroids managed by PCP, OB or other Fertility office.      ARIANNE LINDER on 5/14/24 at 4:15 PM.

## 2024-05-14 NOTE — TELEPHONE ENCOUNTER
Reason for call: Patient is calling to get a referral for endocrinology to get her thyroid checked she is [facility]a patient.  Notes:

## 2024-05-17 ENCOUNTER — LAB (OUTPATIENT)
Dept: LAB | Facility: LAB | Age: 34
End: 2024-05-17
Payer: COMMERCIAL

## 2024-05-17 DIAGNOSIS — Z31.83 ENCOUNTER FOR ASSISTED REPRODUCTIVE FERTILITY PROCEDURE CYCLE: Primary | ICD-10-CM

## 2024-05-17 LAB
ESTRADIOL SERPL-MCNC: 72 PG/ML
LH SERPL-ACNC: 8.9 IU/L
PROGEST SERPL-MCNC: 0.3 NG/ML

## 2024-05-17 PROCEDURE — 84144 ASSAY OF PROGESTERONE: CPT

## 2024-05-17 PROCEDURE — 83002 ASSAY OF GONADOTROPIN (LH): CPT

## 2024-05-17 PROCEDURE — 82670 ASSAY OF TOTAL ESTRADIOL: CPT

## 2024-05-17 PROCEDURE — 36415 COLL VENOUS BLD VENIPUNCTURE: CPT

## 2024-05-23 ENCOUNTER — LAB (OUTPATIENT)
Dept: LAB | Facility: LAB | Age: 34
End: 2024-05-23
Payer: COMMERCIAL

## 2024-05-23 DIAGNOSIS — Z31.83 ENCOUNTER FOR ASSISTED REPRODUCTIVE FERTILITY PROCEDURE CYCLE: Primary | ICD-10-CM

## 2024-05-23 LAB
ESTRADIOL SERPL-MCNC: 268 PG/ML
LH SERPL-ACNC: 8.7 IU/L
PROGEST SERPL-MCNC: <0.3 NG/ML

## 2024-05-23 PROCEDURE — 36415 COLL VENOUS BLD VENIPUNCTURE: CPT

## 2024-05-23 PROCEDURE — 83002 ASSAY OF GONADOTROPIN (LH): CPT

## 2024-05-23 PROCEDURE — 82670 ASSAY OF TOTAL ESTRADIOL: CPT

## 2024-05-23 PROCEDURE — 84144 ASSAY OF PROGESTERONE: CPT

## 2024-06-03 ENCOUNTER — LAB (OUTPATIENT)
Dept: LAB | Facility: LAB | Age: 34
End: 2024-06-03
Payer: COMMERCIAL

## 2024-06-03 DIAGNOSIS — Z31.49 ENCOUNTER FOR OTHER PROCREATIVE INVESTIGATION AND TESTING: Primary | ICD-10-CM

## 2024-06-03 LAB
ESTRADIOL SERPL-MCNC: 205 PG/ML
PROGEST SERPL-MCNC: 43.5 NG/ML

## 2024-06-03 PROCEDURE — 84144 ASSAY OF PROGESTERONE: CPT

## 2024-06-03 PROCEDURE — 82670 ASSAY OF TOTAL ESTRADIOL: CPT

## 2024-06-03 PROCEDURE — 36415 COLL VENOUS BLD VENIPUNCTURE: CPT

## 2024-06-07 ENCOUNTER — ALLIED HEALTH (OUTPATIENT)
Dept: INTEGRATIVE MEDICINE | Facility: CLINIC | Age: 34
End: 2024-06-07
Payer: COMMERCIAL

## 2024-06-07 DIAGNOSIS — N92.6 IRREGULAR MENSES: Primary | ICD-10-CM

## 2024-06-07 DIAGNOSIS — N97.9 INFERTILITY, FEMALE: ICD-10-CM

## 2024-06-07 PROCEDURE — 97811 ACUP 1/> W/O ESTIM EA ADD 15: CPT

## 2024-06-07 PROCEDURE — 97810 ACUP 1/> WO ESTIM 1ST 15 MIN: CPT

## 2024-06-07 NOTE — PROGRESS NOTES
Acupuncture Visit:     Subjective   Patient ID: Sugey Dasilva is a 33 y.o. female who presents for Fertility Support    Update: 06/07/24  Tx 4/20    Patient reports she had her transfer last Thursday in [place].  She is currently 8 days post transfer.  She is feeling slightly nauseous from all the meds and slightly sore from her previus injections.    Update: 03/05/24  Tx 3/20    Feeling tired at the moment as she just woke up an hour ago.      Knees feel good today.  No headaches this week so far.    She will be starting the medication process of her IVF journey sometime this month.    Currently on CD25.  Feeling slightly bloated as she approaches her period.    Update: 02/15/24  Tx 2/20    Moving right along with her plans for IVF.  Will be starting treatments in the next month.    LMP 02/10/24 and ended on the 13th.  Period started early but still within range of normal cycle, just a couple of days early.  Had a headache on Monday.  Did not experience her usual cramping but did have a headache on Monday.  Life has been manageable so far, no feeling of stress but notice she feels just a little nervous about the process starting in a month.  She was stressed on Friday trying to figure out a few things regarding monitoring especially since she is working with a clinic in [place].    Initial Visit: 01/15/24    MC: Chronic Pain and Fertility Support    Chronic pain:  Knee Pain - right ACL surgery in 2019 prior to that she tore 4 ligaments in the right knee.  9 months prior partially tore ACL.  Pain is worse on the right side.  Pain feels dull and is focused below the knee joint.    Sciatica - from high school softball. Rare occasions experience sharp pain in her low back.  Headaches - weekly particularly on the forehead, vertex and occiput.  Feels like tension, throbbing and pulsing.  Normally takes Aleve or drink water.    Currently working with [facility] in [place] for IVF after TTC for over a year; started trying in  May of 2022.  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  Hx of PCOS dx and hypothyroidism.    Hx of Menses:  LMP - Currently on day 3 of period  12 3/28-35  Periods were regular during her teens then got irregular for a while and is now starting to return to normal     Period:  Day 1 - very light , change about 4-5 tampons, 1 or 2 are usually soaked but others are just regular changes  Day 2 - medium flow and tends to be the heaviest day  Day 3 -  light into spotting (rare to have a day 4)  Viscosity - like normal blood flow  Color -  mix of bright red and dark red like strawberry jam  Clotting - dime size with every period  PMS - headache, stomach ache, bloating, breast tenderness, sleep issues with trouble falling asleep  Temperature - even temp     Diet and Lifestyle:  Diet - vegan since 2021- breakfast - oatmeal, flax seeds            lunch - salad or bagel and fruit bar            Dinner - rice, tofu, eating eggs due to IVF  Exercise - usually yoga, was going weekly but schedule change; summer plays volleyball; walks dog  Lifestyle: red light therapy  Sleep - normally good, melatonin helps with falling asleep and is normally able to sleep for 8-9hrs without disruption  Digestion - good now but used to be poor with frequent throwing up  BM - regular, formed and easy to pass     Supplements:  Ovarian Bloom from [facility]  Vitamin E  Prenatal  Ashwaganda  Curcumin  Melatonin 5mg    Medications:  Levothyroxyn          Session Information  Is this acupuncture treatment being billed to the patient's insurance company: Yes  This is visit number: 4  The patient has a total number of visits of: 20  Initial Acupuncture Treatment date: 01/15/24  Last Treatment date: 03/05/24  Name of Insurance Company: [employer] [facility]  Visit Type: Follow-up visit  Medical History Reviewed: I have reviewed  pertinent medical history in EHR, and no contraindications are present to provide treatment         Review of Systems         Provider reviewed plan for the acupuncture session, precautions and contraindications. Patient/guardian/hospital staff has given consent to treat with full understanding of what to expect during the session. Before acupuncture began, provider explained to the patient to communicate at any time if the procedure was causing discomfort past their tolerance level. Patient agreed to advise acupuncturist. The acupuncturist counseled the patient on the risks of acupuncture treatment including pain, infection, bleeding, and no relief of pain. The patient was positioned comfortably. There was no evidence of infection at the site of needle insertions.    Objective   Physical Exam         Treatment Plan  Treatment Goals: Wellbeing improvement, Stress reduction, Relaxation    Acupuncture Treatment  Patient Position: Supine on a table  Acupuncture Needling: Yes  Needle Guage: 36 guage /.20/ Blue seirin  Body Points: With retention  Body Points - Bilateral: [place]ang; GV20; PC6; ST36; LV3  Auricular Points: No  Electroacupuncture Used: No  Needle Count In: 8  Needle Count Out: 8  Needle Retention Time (min): 30 minutes  Total Face to Face Time (min): 30 minutes              Assessment/Plan

## 2024-06-08 ENCOUNTER — LAB (OUTPATIENT)
Dept: LAB | Facility: LAB | Age: 34
End: 2024-06-08
Payer: COMMERCIAL

## 2024-06-08 DIAGNOSIS — Z32.00 ENCOUNTER FOR PREGNANCY TEST, RESULT UNKNOWN: Primary | ICD-10-CM

## 2024-06-08 LAB
B-HCG SERPL-ACNC: 29 MIU/ML
PROGEST SERPL-MCNC: 41.6 NG/ML

## 2024-06-08 PROCEDURE — 84144 ASSAY OF PROGESTERONE: CPT

## 2024-06-08 PROCEDURE — 36415 COLL VENOUS BLD VENIPUNCTURE: CPT

## 2024-06-08 PROCEDURE — 84702 CHORIONIC GONADOTROPIN TEST: CPT

## 2024-06-10 ENCOUNTER — LAB (OUTPATIENT)
Dept: LAB | Facility: LAB | Age: 34
End: 2024-06-10
Payer: COMMERCIAL

## 2024-06-10 DIAGNOSIS — Z32.01 ENCOUNTER FOR PREGNANCY TEST, RESULT POSITIVE (HHS-HCC): Primary | ICD-10-CM

## 2024-06-10 LAB
B-HCG SERPL-ACNC: 41 MIU/ML
ESTRADIOL SERPL-MCNC: 213 PG/ML
PROGEST SERPL-MCNC: 43.9 NG/ML
TSH SERPL-ACNC: 3.56 MIU/L (ref 0.44–3.98)

## 2024-06-10 PROCEDURE — 82670 ASSAY OF TOTAL ESTRADIOL: CPT

## 2024-06-10 PROCEDURE — 84702 CHORIONIC GONADOTROPIN TEST: CPT

## 2024-06-10 PROCEDURE — 36415 COLL VENOUS BLD VENIPUNCTURE: CPT

## 2024-06-10 PROCEDURE — 84144 ASSAY OF PROGESTERONE: CPT

## 2024-06-10 PROCEDURE — 84443 ASSAY THYROID STIM HORMONE: CPT

## 2024-06-12 ENCOUNTER — LAB (OUTPATIENT)
Dept: LAB | Facility: LAB | Age: 34
End: 2024-06-12
Payer: COMMERCIAL

## 2024-06-12 DIAGNOSIS — Z32.01 ENCOUNTER FOR PREGNANCY TEST, RESULT POSITIVE (HHS-HCC): Primary | ICD-10-CM

## 2024-06-12 DIAGNOSIS — O02.81 INAPPROPRIATE CHANGE IN QUANTITATIVE HUMAN CHORIONIC GONADOTROPIN (HCG) IN EARLY PREGNANCY (HHS-HCC): ICD-10-CM

## 2024-06-12 LAB
B-HCG SERPL-ACNC: 46 MIU/ML
ESTRADIOL SERPL-MCNC: 251 PG/ML
PROGEST SERPL-MCNC: 56.2 NG/ML

## 2024-06-12 PROCEDURE — 36415 COLL VENOUS BLD VENIPUNCTURE: CPT

## 2024-06-12 PROCEDURE — 84144 ASSAY OF PROGESTERONE: CPT

## 2024-06-12 PROCEDURE — 84702 CHORIONIC GONADOTROPIN TEST: CPT

## 2024-06-12 PROCEDURE — 82670 ASSAY OF TOTAL ESTRADIOL: CPT

## 2024-06-14 ENCOUNTER — LAB (OUTPATIENT)
Dept: LAB | Facility: LAB | Age: 34
End: 2024-06-14
Payer: COMMERCIAL

## 2024-06-14 DIAGNOSIS — O02.81 INAPPROPRIATE CHANGE IN QUANTITATIVE HUMAN CHORIONIC GONADOTROPIN (HCG) IN EARLY PREGNANCY (HHS-HCC): Primary | ICD-10-CM

## 2024-06-14 LAB
B-HCG SERPL-ACNC: 29 MIU/ML
ESTRADIOL SERPL-MCNC: 189 PG/ML
PROGEST SERPL-MCNC: 45 NG/ML

## 2024-06-14 PROCEDURE — 82670 ASSAY OF TOTAL ESTRADIOL: CPT

## 2024-06-14 PROCEDURE — 36415 COLL VENOUS BLD VENIPUNCTURE: CPT

## 2024-06-14 PROCEDURE — 84702 CHORIONIC GONADOTROPIN TEST: CPT

## 2024-06-14 PROCEDURE — 84144 ASSAY OF PROGESTERONE: CPT

## 2024-06-17 ENCOUNTER — LAB (OUTPATIENT)
Dept: LAB | Facility: LAB | Age: 34
End: 2024-06-17
Payer: COMMERCIAL

## 2024-06-17 DIAGNOSIS — Z32.01 ENCOUNTER FOR PREGNANCY TEST, RESULT POSITIVE (HHS-HCC): Primary | ICD-10-CM

## 2024-06-17 LAB — B-HCG SERPL-ACNC: 9 MIU/ML

## 2024-06-17 PROCEDURE — 84702 CHORIONIC GONADOTROPIN TEST: CPT

## 2024-06-17 PROCEDURE — 36415 COLL VENOUS BLD VENIPUNCTURE: CPT

## 2024-06-20 ENCOUNTER — LAB (OUTPATIENT)
Dept: LAB | Facility: LAB | Age: 34
End: 2024-06-20
Payer: COMMERCIAL

## 2024-06-20 DIAGNOSIS — E55.9 VITAMIN D DEFICIENCY, UNSPECIFIED: ICD-10-CM

## 2024-06-20 DIAGNOSIS — E03.9 HYPOTHYROIDISM, UNSPECIFIED: Primary | ICD-10-CM

## 2024-06-20 DIAGNOSIS — R53.83 OTHER FATIGUE: ICD-10-CM

## 2024-06-20 LAB
25(OH)D3 SERPL-MCNC: 64 NG/ML (ref 30–100)
ALBUMIN SERPL BCP-MCNC: 4 G/DL (ref 3.4–5)
ALP SERPL-CCNC: 48 U/L (ref 33–110)
ALT SERPL W P-5'-P-CCNC: 16 U/L (ref 7–45)
ANION GAP SERPL CALC-SCNC: 13 MMOL/L (ref 10–20)
AST SERPL W P-5'-P-CCNC: 12 U/L (ref 9–39)
BILIRUB SERPL-MCNC: 0.4 MG/DL (ref 0–1.2)
BUN SERPL-MCNC: 9 MG/DL (ref 6–23)
CALCIUM SERPL-MCNC: 8.9 MG/DL (ref 8.6–10.3)
CHLORIDE SERPL-SCNC: 106 MMOL/L (ref 98–107)
CO2 SERPL-SCNC: 24 MMOL/L (ref 21–32)
CREAT SERPL-MCNC: 0.72 MG/DL (ref 0.5–1.05)
EGFRCR SERPLBLD CKD-EPI 2021: >90 ML/MIN/1.73M*2
ERYTHROCYTE [DISTWIDTH] IN BLOOD BY AUTOMATED COUNT: 12.2 % (ref 11.5–14.5)
FOLATE SERPL-MCNC: >22.3 NG/ML
GLUCOSE SERPL-MCNC: 116 MG/DL (ref 74–99)
HCT VFR BLD AUTO: 37.4 % (ref 36–46)
HGB BLD-MCNC: 12.4 G/DL (ref 12–16)
MCH RBC QN AUTO: 29.3 PG (ref 26–34)
MCHC RBC AUTO-ENTMCNC: 33.2 G/DL (ref 32–36)
MCV RBC AUTO: 88 FL (ref 80–100)
NRBC BLD-RTO: 0 /100 WBCS (ref 0–0)
PLATELET # BLD AUTO: 275 X10*3/UL (ref 150–450)
POTASSIUM SERPL-SCNC: 4 MMOL/L (ref 3.5–5.3)
PROT SERPL-MCNC: 6.7 G/DL (ref 6.4–8.2)
RBC # BLD AUTO: 4.23 X10*6/UL (ref 4–5.2)
SODIUM SERPL-SCNC: 139 MMOL/L (ref 136–145)
T4 FREE SERPL-MCNC: 0.87 NG/DL (ref 0.61–1.12)
THYROPEROXIDASE AB SERPL-ACNC: 82 IU/ML
TSH SERPL-ACNC: 1.03 MIU/L (ref 0.44–3.98)
VIT B12 SERPL-MCNC: 339 PG/ML (ref 211–911)
WBC # BLD AUTO: 6.5 X10*3/UL (ref 4.4–11.3)

## 2024-06-20 PROCEDURE — 82607 VITAMIN B-12: CPT

## 2024-06-20 PROCEDURE — 80053 COMPREHEN METABOLIC PANEL: CPT

## 2024-06-20 PROCEDURE — 84439 ASSAY OF FREE THYROXINE: CPT

## 2024-06-20 PROCEDURE — 86376 MICROSOMAL ANTIBODY EACH: CPT

## 2024-06-20 PROCEDURE — 85027 COMPLETE CBC AUTOMATED: CPT

## 2024-06-20 PROCEDURE — 82306 VITAMIN D 25 HYDROXY: CPT

## 2024-06-20 PROCEDURE — 86800 THYROGLOBULIN ANTIBODY: CPT

## 2024-06-20 PROCEDURE — 83519 RIA NONANTIBODY: CPT

## 2024-06-20 PROCEDURE — 36415 COLL VENOUS BLD VENIPUNCTURE: CPT

## 2024-06-20 PROCEDURE — 84432 ASSAY OF THYROGLOBULIN: CPT

## 2024-06-20 PROCEDURE — 82746 ASSAY OF FOLIC ACID SERUM: CPT

## 2024-06-20 PROCEDURE — 84443 ASSAY THYROID STIM HORMONE: CPT

## 2024-06-22 LAB
BILL ONLY-THYROGLOBULIN: NORMAL
THYROGLOB AB SERPL-ACNC: <0.9 IU/ML (ref 0–4)
THYROGLOB SERPL-MCNC: 12.2 NG/ML (ref 1.3–31.8)
THYROGLOB SERPL-MCNC: NORMAL NG/ML (ref 1.3–31.8)
TSH RECEP AB SER-ACNC: <1.1 IU/L

## 2024-06-24 ENCOUNTER — LAB (OUTPATIENT)
Dept: LAB | Facility: LAB | Age: 34
End: 2024-06-24
Payer: COMMERCIAL

## 2024-06-24 DIAGNOSIS — Z32.01 ENCOUNTER FOR PREGNANCY TEST, RESULT POSITIVE (HHS-HCC): Primary | ICD-10-CM

## 2024-06-24 LAB — B-HCG SERPL-ACNC: <2 MIU/ML

## 2024-06-24 PROCEDURE — 36415 COLL VENOUS BLD VENIPUNCTURE: CPT

## 2024-06-24 PROCEDURE — 84702 CHORIONIC GONADOTROPIN TEST: CPT

## 2024-07-01 ENCOUNTER — TELEPHONE (OUTPATIENT)
Dept: OBSTETRICS AND GYNECOLOGY | Facility: CLINIC | Age: 34
End: 2024-07-01
Payer: COMMERCIAL

## 2024-07-01 NOTE — TELEPHONE ENCOUNTER
Pt recently went through IVF 5/30 that ended in a chemical pregnancy, she doesn't have gyn in area besides when she sees us. Pt was advised to ask if she can get an order for hysteroscopy. She has a paper order but was unsure if that was something she could just schedule through one of our Docs. Please advise.  I can always make an appt for pt to discuss further

## 2024-07-02 NOTE — TELEPHONE ENCOUNTER
Called and spoke with patient. She said that she had a back up, [facility] who she [facility] yesterday in case she couldn't get in with us and they offered her an appt next week, but she will keep us in mind if needed and will still see Mike at her November appt

## 2024-07-08 DIAGNOSIS — E55.9 VITAMIN D DEFICIENCY, UNSPECIFIED: ICD-10-CM

## 2024-07-08 DIAGNOSIS — E03.9 HYPOTHYROIDISM, UNSPECIFIED: Primary | ICD-10-CM

## 2024-08-19 ENCOUNTER — LAB (OUTPATIENT)
Dept: LAB | Facility: LAB | Age: 34
End: 2024-08-19
Payer: COMMERCIAL

## 2024-08-19 DIAGNOSIS — Z31.83 ENCOUNTER FOR ASSISTED REPRODUCTIVE FERTILITY PROCEDURE CYCLE: Primary | ICD-10-CM

## 2024-08-19 LAB
B-HCG SERPL-ACNC: <2 MIU/ML
ESTRADIOL SERPL-MCNC: 66 PG/ML
FSH SERPL-ACNC: 5.9 IU/L
LH SERPL-ACNC: 8.3 IU/L
PROGEST SERPL-MCNC: 0.4 NG/ML
TSH SERPL-ACNC: 0.46 MIU/L (ref 0.44–3.98)

## 2024-08-19 PROCEDURE — 84702 CHORIONIC GONADOTROPIN TEST: CPT

## 2024-08-19 PROCEDURE — 84144 ASSAY OF PROGESTERONE: CPT

## 2024-08-19 PROCEDURE — 84443 ASSAY THYROID STIM HORMONE: CPT

## 2024-08-19 PROCEDURE — 82670 ASSAY OF TOTAL ESTRADIOL: CPT

## 2024-08-19 PROCEDURE — 83002 ASSAY OF GONADOTROPIN (LH): CPT

## 2024-08-19 PROCEDURE — 83001 ASSAY OF GONADOTROPIN (FSH): CPT

## 2024-08-19 PROCEDURE — 36415 COLL VENOUS BLD VENIPUNCTURE: CPT

## 2024-08-26 ENCOUNTER — LAB (OUTPATIENT)
Dept: LAB | Facility: LAB | Age: 34
End: 2024-08-26
Payer: COMMERCIAL

## 2024-08-26 DIAGNOSIS — Z31.81 ENCOUNTER FOR MALE FACTOR INFERTILITY IN FEMALE PATIENT (CODE): Primary | ICD-10-CM

## 2024-08-26 LAB
B-HCG SERPL-ACNC: <2 MIU/ML
ESTRADIOL SERPL-MCNC: 204 PG/ML
FSH SERPL-ACNC: 2 IU/L
LH SERPL-ACNC: 8.2 IU/L
PROGEST SERPL-MCNC: <0.3 NG/ML
TSH SERPL-ACNC: 0.4 MIU/L (ref 0.44–3.98)

## 2024-08-26 PROCEDURE — 82670 ASSAY OF TOTAL ESTRADIOL: CPT

## 2024-08-26 PROCEDURE — 84702 CHORIONIC GONADOTROPIN TEST: CPT

## 2024-08-26 PROCEDURE — 83002 ASSAY OF GONADOTROPIN (LH): CPT

## 2024-08-26 PROCEDURE — 84144 ASSAY OF PROGESTERONE: CPT

## 2024-08-26 PROCEDURE — 36415 COLL VENOUS BLD VENIPUNCTURE: CPT

## 2024-08-26 PROCEDURE — 83001 ASSAY OF GONADOTROPIN (FSH): CPT

## 2024-08-26 PROCEDURE — 84443 ASSAY THYROID STIM HORMONE: CPT

## 2024-08-28 ENCOUNTER — LAB (OUTPATIENT)
Dept: LAB | Facility: LAB | Age: 34
End: 2024-08-28
Payer: COMMERCIAL

## 2024-08-28 DIAGNOSIS — Z31.83 ENCOUNTER FOR ASSISTED REPRODUCTIVE FERTILITY PROCEDURE CYCLE: Primary | ICD-10-CM

## 2024-08-28 LAB
ESTRADIOL SERPL-MCNC: 454 PG/ML
LH SERPL-ACNC: 6.9 IU/L
PROGEST SERPL-MCNC: 0.4 NG/ML

## 2024-08-28 PROCEDURE — 82670 ASSAY OF TOTAL ESTRADIOL: CPT

## 2024-08-28 PROCEDURE — 36415 COLL VENOUS BLD VENIPUNCTURE: CPT

## 2024-08-28 PROCEDURE — 84144 ASSAY OF PROGESTERONE: CPT

## 2024-08-28 PROCEDURE — 83002 ASSAY OF GONADOTROPIN (LH): CPT

## 2024-09-09 ENCOUNTER — LAB (OUTPATIENT)
Dept: LAB | Facility: LAB | Age: 34
End: 2024-09-09
Payer: COMMERCIAL

## 2024-09-09 DIAGNOSIS — Z31.49 ENCOUNTER FOR OTHER PROCREATIVE INVESTIGATION AND TESTING: Primary | ICD-10-CM

## 2024-09-09 LAB
ESTRADIOL SERPL-MCNC: 1560 PG/ML
PROGEST SERPL-MCNC: 36.4 NG/ML

## 2024-09-09 PROCEDURE — 84144 ASSAY OF PROGESTERONE: CPT

## 2024-09-09 PROCEDURE — 36415 COLL VENOUS BLD VENIPUNCTURE: CPT

## 2024-09-09 PROCEDURE — 82670 ASSAY OF TOTAL ESTRADIOL: CPT

## 2024-09-13 ENCOUNTER — LAB (OUTPATIENT)
Dept: LAB | Facility: LAB | Age: 34
End: 2024-09-13
Payer: COMMERCIAL

## 2024-09-13 DIAGNOSIS — E03.9 HYPOTHYROIDISM, UNSPECIFIED: ICD-10-CM

## 2024-09-13 DIAGNOSIS — E55.9 VITAMIN D DEFICIENCY, UNSPECIFIED: ICD-10-CM

## 2024-09-13 DIAGNOSIS — Z32.00 ENCOUNTER FOR PREGNANCY TEST, RESULT UNKNOWN: Primary | ICD-10-CM

## 2024-09-13 LAB
25(OH)D3 SERPL-MCNC: 61 NG/ML (ref 30–100)
ALBUMIN SERPL BCP-MCNC: 4.4 G/DL (ref 3.4–5)
ALP SERPL-CCNC: 49 U/L (ref 33–110)
ALT SERPL W P-5'-P-CCNC: 28 U/L (ref 7–45)
ANION GAP SERPL CALC-SCNC: 12 MMOL/L (ref 10–20)
AST SERPL W P-5'-P-CCNC: 12 U/L (ref 9–39)
B-HCG SERPL-ACNC: 41 MIU/ML
BILIRUB SERPL-MCNC: 0.5 MG/DL (ref 0–1.2)
BUN SERPL-MCNC: 14 MG/DL (ref 6–23)
CALCIUM SERPL-MCNC: 9.6 MG/DL (ref 8.6–10.3)
CHLORIDE SERPL-SCNC: 104 MMOL/L (ref 98–107)
CO2 SERPL-SCNC: 26 MMOL/L (ref 21–32)
CREAT SERPL-MCNC: 0.78 MG/DL (ref 0.5–1.05)
EGFRCR SERPLBLD CKD-EPI 2021: >90 ML/MIN/1.73M*2
GLUCOSE SERPL-MCNC: 77 MG/DL (ref 74–99)
POTASSIUM SERPL-SCNC: 3.9 MMOL/L (ref 3.5–5.3)
PROGEST SERPL-MCNC: 17.7 NG/ML
PROT SERPL-MCNC: 6.8 G/DL (ref 6.4–8.2)
SODIUM SERPL-SCNC: 138 MMOL/L (ref 136–145)
T4 FREE SERPL-MCNC: 0.86 NG/DL (ref 0.61–1.12)
THYROPEROXIDASE AB SERPL-ACNC: <28 IU/ML
TSH SERPL-ACNC: 1.75 MIU/L (ref 0.44–3.98)

## 2024-09-13 PROCEDURE — 80053 COMPREHEN METABOLIC PANEL: CPT

## 2024-09-13 PROCEDURE — 84439 ASSAY OF FREE THYROXINE: CPT

## 2024-09-13 PROCEDURE — 84443 ASSAY THYROID STIM HORMONE: CPT

## 2024-09-13 PROCEDURE — 86376 MICROSOMAL ANTIBODY EACH: CPT

## 2024-09-13 PROCEDURE — 84144 ASSAY OF PROGESTERONE: CPT

## 2024-09-13 PROCEDURE — 36415 COLL VENOUS BLD VENIPUNCTURE: CPT

## 2024-09-13 PROCEDURE — 82306 VITAMIN D 25 HYDROXY: CPT

## 2024-09-13 PROCEDURE — 84702 CHORIONIC GONADOTROPIN TEST: CPT

## 2024-09-16 ENCOUNTER — LAB (OUTPATIENT)
Dept: LAB | Facility: LAB | Age: 34
End: 2024-09-16
Payer: COMMERCIAL

## 2024-09-16 DIAGNOSIS — Z32.01 ENCOUNTER FOR PREGNANCY TEST, RESULT POSITIVE (HHS-HCC): Primary | ICD-10-CM

## 2024-09-16 LAB
B-HCG SERPL-ACNC: 41 MIU/ML
ESTRADIOL SERPL-MCNC: 1285 PG/ML
PROGEST SERPL-MCNC: 13.8 NG/ML

## 2024-09-16 PROCEDURE — 84702 CHORIONIC GONADOTROPIN TEST: CPT

## 2024-09-16 PROCEDURE — 84144 ASSAY OF PROGESTERONE: CPT

## 2024-09-16 PROCEDURE — 82670 ASSAY OF TOTAL ESTRADIOL: CPT

## 2024-09-16 PROCEDURE — 36415 COLL VENOUS BLD VENIPUNCTURE: CPT

## 2024-09-18 ENCOUNTER — LAB (OUTPATIENT)
Dept: LAB | Facility: LAB | Age: 34
End: 2024-09-18
Payer: COMMERCIAL

## 2024-09-18 DIAGNOSIS — O02.81 INAPPROPRIATE CHANGE IN QUANTITATIVE HUMAN CHORIONIC GONADOTROPIN (HCG) IN EARLY PREGNANCY (HHS-HCC): Primary | ICD-10-CM

## 2024-09-18 LAB
B-HCG SERPL-ACNC: 21 MIU/ML
ESTRADIOL SERPL-MCNC: 173 PG/ML
PROGEST SERPL-MCNC: 6.2 NG/ML

## 2024-09-18 PROCEDURE — 84702 CHORIONIC GONADOTROPIN TEST: CPT

## 2024-09-18 PROCEDURE — 82670 ASSAY OF TOTAL ESTRADIOL: CPT

## 2024-09-18 PROCEDURE — 84144 ASSAY OF PROGESTERONE: CPT

## 2024-09-18 PROCEDURE — 36415 COLL VENOUS BLD VENIPUNCTURE: CPT

## 2024-09-24 ENCOUNTER — LAB (OUTPATIENT)
Dept: LAB | Facility: LAB | Age: 34
End: 2024-09-24
Payer: COMMERCIAL

## 2024-09-24 DIAGNOSIS — Z31.81 ENCOUNTER FOR MALE FACTOR INFERTILITY IN FEMALE PATIENT (CODE): Primary | ICD-10-CM

## 2024-09-24 DIAGNOSIS — E03.9 HYPOTHYROIDISM, UNSPECIFIED: Primary | ICD-10-CM

## 2024-09-24 LAB
B-HCG SERPL-ACNC: 3 MIU/ML
ESTRADIOL SERPL-MCNC: 40 PG/ML
FSH SERPL-ACNC: 5.2 IU/L
LH SERPL-ACNC: 6 IU/L
PROGEST SERPL-MCNC: <0.3 NG/ML
TSH SERPL-ACNC: 1.35 MIU/L (ref 0.44–3.98)

## 2024-09-24 PROCEDURE — 84443 ASSAY THYROID STIM HORMONE: CPT

## 2024-09-24 PROCEDURE — 83002 ASSAY OF GONADOTROPIN (LH): CPT

## 2024-09-24 PROCEDURE — 84702 CHORIONIC GONADOTROPIN TEST: CPT

## 2024-09-24 PROCEDURE — 83001 ASSAY OF GONADOTROPIN (FSH): CPT

## 2024-09-24 PROCEDURE — 82670 ASSAY OF TOTAL ESTRADIOL: CPT

## 2024-09-24 PROCEDURE — 84144 ASSAY OF PROGESTERONE: CPT

## 2024-09-24 PROCEDURE — 36415 COLL VENOUS BLD VENIPUNCTURE: CPT

## 2024-10-01 ENCOUNTER — LAB (OUTPATIENT)
Dept: LAB | Facility: LAB | Age: 34
End: 2024-10-01
Payer: COMMERCIAL

## 2024-10-01 DIAGNOSIS — Z31.83 ENCOUNTER FOR ASSISTED REPRODUCTIVE FERTILITY PROCEDURE CYCLE: Primary | ICD-10-CM

## 2024-10-01 LAB
ESTRADIOL SERPL-MCNC: 314 PG/ML
LH SERPL-ACNC: 9.5 IU/L
PROGEST SERPL-MCNC: <0.3 NG/ML

## 2024-10-01 PROCEDURE — 82670 ASSAY OF TOTAL ESTRADIOL: CPT

## 2024-10-01 PROCEDURE — 83002 ASSAY OF GONADOTROPIN (LH): CPT

## 2024-10-01 PROCEDURE — 84144 ASSAY OF PROGESTERONE: CPT

## 2024-10-01 PROCEDURE — 36415 COLL VENOUS BLD VENIPUNCTURE: CPT

## 2024-10-12 ENCOUNTER — LAB (OUTPATIENT)
Dept: LAB | Facility: LAB | Age: 34
End: 2024-10-12
Payer: COMMERCIAL

## 2024-10-12 DIAGNOSIS — E03.9 HYPOTHYROIDISM, UNSPECIFIED: ICD-10-CM

## 2024-10-12 DIAGNOSIS — Z31.49 ENCOUNTER FOR OTHER PROCREATIVE INVESTIGATION AND TESTING: Primary | ICD-10-CM

## 2024-10-12 LAB
ALBUMIN SERPL BCP-MCNC: 4.2 G/DL (ref 3.4–5)
ALP SERPL-CCNC: 43 U/L (ref 33–110)
ALT SERPL W P-5'-P-CCNC: 18 U/L (ref 7–45)
ANION GAP SERPL CALC-SCNC: 14 MMOL/L (ref 10–20)
AST SERPL W P-5'-P-CCNC: 9 U/L (ref 9–39)
BILIRUB SERPL-MCNC: 0.4 MG/DL (ref 0–1.2)
BUN SERPL-MCNC: 14 MG/DL (ref 6–23)
CALCIUM SERPL-MCNC: 9.3 MG/DL (ref 8.6–10.3)
CHLORIDE SERPL-SCNC: 102 MMOL/L (ref 98–107)
CO2 SERPL-SCNC: 26 MMOL/L (ref 21–32)
CREAT SERPL-MCNC: 0.93 MG/DL (ref 0.5–1.05)
EGFRCR SERPLBLD CKD-EPI 2021: 83 ML/MIN/1.73M*2
ESTRADIOL SERPL-MCNC: 196 PG/ML
GLUCOSE SERPL-MCNC: 121 MG/DL (ref 74–99)
POTASSIUM SERPL-SCNC: 3.9 MMOL/L (ref 3.5–5.3)
PROGEST SERPL-MCNC: 30.9 NG/ML
PROT SERPL-MCNC: 7.2 G/DL (ref 6.4–8.2)
SODIUM SERPL-SCNC: 138 MMOL/L (ref 136–145)
T3FREE SERPL-MCNC: 2.1 PG/ML (ref 2.3–4.2)
T4 FREE SERPL-MCNC: 0.78 NG/DL (ref 0.61–1.12)
TSH SERPL-ACNC: 3.1 MIU/L (ref 0.44–3.98)

## 2024-10-12 PROCEDURE — 80053 COMPREHEN METABOLIC PANEL: CPT

## 2024-10-12 PROCEDURE — 36415 COLL VENOUS BLD VENIPUNCTURE: CPT

## 2024-10-12 PROCEDURE — 84439 ASSAY OF FREE THYROXINE: CPT

## 2024-10-12 PROCEDURE — 84443 ASSAY THYROID STIM HORMONE: CPT

## 2024-10-12 PROCEDURE — 84481 FREE ASSAY (FT-3): CPT

## 2024-10-12 PROCEDURE — 84144 ASSAY OF PROGESTERONE: CPT

## 2024-10-12 PROCEDURE — 82670 ASSAY OF TOTAL ESTRADIOL: CPT

## 2024-10-16 DIAGNOSIS — E03.8 OTHER SPECIFIED HYPOTHYROIDISM: Primary | ICD-10-CM

## 2024-10-18 ENCOUNTER — LAB (OUTPATIENT)
Dept: LAB | Facility: LAB | Age: 34
End: 2024-10-18
Payer: COMMERCIAL

## 2024-10-18 DIAGNOSIS — Z32.00 ENCOUNTER FOR PREGNANCY TEST, RESULT UNKNOWN: Primary | ICD-10-CM

## 2024-10-18 LAB
B-HCG SERPL-ACNC: 195 MIU/ML
PROGEST SERPL-MCNC: 30.7 NG/ML

## 2024-10-18 PROCEDURE — 84702 CHORIONIC GONADOTROPIN TEST: CPT

## 2024-10-18 PROCEDURE — 84144 ASSAY OF PROGESTERONE: CPT

## 2024-10-18 PROCEDURE — 36415 COLL VENOUS BLD VENIPUNCTURE: CPT

## 2024-10-21 ENCOUNTER — LAB (OUTPATIENT)
Dept: LAB | Facility: LAB | Age: 34
End: 2024-10-21
Payer: COMMERCIAL

## 2024-10-21 DIAGNOSIS — Z32.01 ENCOUNTER FOR PREGNANCY TEST, RESULT POSITIVE (HHS-HCC): Primary | ICD-10-CM

## 2024-10-21 LAB
B-HCG SERPL-ACNC: 606 MIU/ML
ESTRADIOL SERPL-MCNC: 235 PG/ML
PROGEST SERPL-MCNC: 36 NG/ML
TSH SERPL-ACNC: 1.12 MIU/L (ref 0.44–3.98)

## 2024-10-21 PROCEDURE — 84144 ASSAY OF PROGESTERONE: CPT

## 2024-10-21 PROCEDURE — 84702 CHORIONIC GONADOTROPIN TEST: CPT

## 2024-10-21 PROCEDURE — 84443 ASSAY THYROID STIM HORMONE: CPT

## 2024-10-21 PROCEDURE — 36415 COLL VENOUS BLD VENIPUNCTURE: CPT

## 2024-10-21 PROCEDURE — 82670 ASSAY OF TOTAL ESTRADIOL: CPT

## 2024-10-31 ENCOUNTER — LAB (OUTPATIENT)
Dept: LAB | Facility: LAB | Age: 34
End: 2024-10-31
Payer: COMMERCIAL

## 2024-10-31 DIAGNOSIS — O09.00 SUPERVISION OF PREGNANCY WITH HISTORY OF INFERTILITY, UNSPECIFIED TRIMESTER: Primary | ICD-10-CM

## 2024-10-31 LAB
B-HCG SERPL-ACNC: ABNORMAL MIU/ML
ESTRADIOL SERPL-MCNC: 239 PG/ML
PROGEST SERPL-MCNC: 26.2 NG/ML

## 2024-10-31 PROCEDURE — 36415 COLL VENOUS BLD VENIPUNCTURE: CPT

## 2024-10-31 PROCEDURE — 84144 ASSAY OF PROGESTERONE: CPT

## 2024-10-31 PROCEDURE — 82670 ASSAY OF TOTAL ESTRADIOL: CPT

## 2024-10-31 PROCEDURE — 84702 CHORIONIC GONADOTROPIN TEST: CPT

## 2024-11-07 ENCOUNTER — LAB (OUTPATIENT)
Dept: LAB | Facility: LAB | Age: 34
End: 2024-11-07
Payer: COMMERCIAL

## 2024-11-07 DIAGNOSIS — O09.00 SUPERVISION OF PREGNANCY WITH HISTORY OF INFERTILITY, UNSPECIFIED TRIMESTER: Primary | ICD-10-CM

## 2024-11-07 DIAGNOSIS — E03.8 OTHER SPECIFIED HYPOTHYROIDISM: ICD-10-CM

## 2024-11-07 LAB
ALBUMIN SERPL BCP-MCNC: 4 G/DL (ref 3.4–5)
ALP SERPL-CCNC: 50 U/L (ref 33–110)
ALT SERPL W P-5'-P-CCNC: 23 U/L (ref 7–45)
ANION GAP SERPL CALC-SCNC: 13 MMOL/L (ref 10–20)
AST SERPL W P-5'-P-CCNC: 11 U/L (ref 9–39)
B-HCG SERPL-ACNC: ABNORMAL MIU/ML
BILIRUB SERPL-MCNC: 0.4 MG/DL (ref 0–1.2)
BUN SERPL-MCNC: 15 MG/DL (ref 6–23)
CALCIUM SERPL-MCNC: 9.3 MG/DL (ref 8.6–10.3)
CHLORIDE SERPL-SCNC: 103 MMOL/L (ref 98–107)
CO2 SERPL-SCNC: 26 MMOL/L (ref 21–32)
CREAT SERPL-MCNC: 0.87 MG/DL (ref 0.5–1.05)
EGFRCR SERPLBLD CKD-EPI 2021: 90 ML/MIN/1.73M*2
ESTRADIOL SERPL-MCNC: 280 PG/ML
GLUCOSE SERPL-MCNC: 100 MG/DL (ref 74–99)
POTASSIUM SERPL-SCNC: 3.7 MMOL/L (ref 3.5–5.3)
PROGEST SERPL-MCNC: 20.2 NG/ML
PROT SERPL-MCNC: 6.9 G/DL (ref 6.4–8.2)
SODIUM SERPL-SCNC: 138 MMOL/L (ref 136–145)
T4 FREE SERPL-MCNC: 1 NG/DL (ref 0.61–1.12)
TSH SERPL-ACNC: 1.14 MIU/L (ref 0.44–3.98)

## 2024-11-07 PROCEDURE — 82670 ASSAY OF TOTAL ESTRADIOL: CPT

## 2024-11-07 PROCEDURE — 84443 ASSAY THYROID STIM HORMONE: CPT

## 2024-11-07 PROCEDURE — 36415 COLL VENOUS BLD VENIPUNCTURE: CPT

## 2024-11-07 PROCEDURE — 84439 ASSAY OF FREE THYROXINE: CPT

## 2024-11-07 PROCEDURE — 80053 COMPREHEN METABOLIC PANEL: CPT

## 2024-11-07 PROCEDURE — 84144 ASSAY OF PROGESTERONE: CPT

## 2024-11-07 PROCEDURE — 84702 CHORIONIC GONADOTROPIN TEST: CPT

## 2024-11-18 ENCOUNTER — APPOINTMENT (OUTPATIENT)
Dept: OBSTETRICS AND GYNECOLOGY | Facility: CLINIC | Age: 34
End: 2024-11-18
Payer: COMMERCIAL

## 2024-11-19 DIAGNOSIS — E03.9 HYPOTHYROIDISM, UNSPECIFIED: Primary | ICD-10-CM

## 2024-11-22 ENCOUNTER — APPOINTMENT (OUTPATIENT)
Dept: OBSTETRICS AND GYNECOLOGY | Facility: CLINIC | Age: 34
End: 2024-11-22
Payer: COMMERCIAL

## 2024-11-22 VITALS — BODY MASS INDEX: 23.78 KG/M2 | WEIGHT: 130 LBS | DIASTOLIC BLOOD PRESSURE: 68 MMHG | SYSTOLIC BLOOD PRESSURE: 110 MMHG

## 2024-11-22 DIAGNOSIS — E03.9 HYPOTHYROIDISM, UNSPECIFIED TYPE: ICD-10-CM

## 2024-11-22 DIAGNOSIS — Z34.91 PRENATAL CARE IN FIRST TRIMESTER (HHS-HCC): ICD-10-CM

## 2024-11-22 DIAGNOSIS — Q87.81 ALPORT SYNDROME (HHS-HCC): Primary | ICD-10-CM

## 2024-11-22 DIAGNOSIS — N97.9 INFERTILITY, FEMALE: ICD-10-CM

## 2024-11-22 DIAGNOSIS — Z3A.09 9 WEEKS GESTATION OF PREGNANCY (HHS-HCC): ICD-10-CM

## 2024-11-22 PROCEDURE — 0500F INITIAL PRENATAL CARE VISIT: CPT | Performed by: OBSTETRICS & GYNECOLOGY

## 2024-11-22 PROCEDURE — 87086 URINE CULTURE/COLONY COUNT: CPT

## 2024-11-22 PROCEDURE — 87491 CHLMYD TRACH DNA AMP PROBE: CPT

## 2024-11-22 PROCEDURE — 87591 N.GONORRHOEAE DNA AMP PROB: CPT

## 2024-11-22 RX ORDER — FISH OIL 0.1 G/ML
INJECTION, EMULSION INTRAVENOUS
COMMUNITY
Start: 2024-10-02

## 2024-11-22 RX ORDER — SYRINGE WITH NEEDLE, 1 ML 25GX5/8"
SYRINGE, EMPTY DISPOSABLE MISCELLANEOUS
COMMUNITY
Start: 2024-10-29

## 2024-11-22 RX ORDER — DOXYCYCLINE 100 MG/1
CAPSULE ORAL
COMMUNITY
Start: 2024-09-24

## 2024-11-22 RX ORDER — PROGESTERONE 200 MG/1
CAPSULE ORAL
COMMUNITY
Start: 2024-11-08

## 2024-11-22 RX ORDER — ENOXAPARIN SODIUM 100 MG/ML
INJECTION SUBCUTANEOUS
COMMUNITY
Start: 2024-10-26

## 2024-11-22 RX ORDER — TACROLIMUS 0.5 MG/1
CAPSULE ORAL
COMMUNITY
Start: 2024-11-11

## 2024-11-22 RX ORDER — NEEDLES, DISPOSABLE 25GX5/8"
NEEDLE, DISPOSABLE MISCELLANEOUS
COMMUNITY
Start: 2024-10-29

## 2024-11-22 RX ORDER — PREDNISONE 10 MG/1
10 TABLET ORAL 2 TIMES DAILY
COMMUNITY
Start: 2024-11-01

## 2024-11-22 RX ORDER — ESTRADIOL 2 MG/1
TABLET ORAL
COMMUNITY
Start: 2024-11-15

## 2024-11-22 RX ORDER — CHORIOGONADOTROPIN ALFA 10000 UNIT
KIT INTRAMUSCULAR
COMMUNITY
Start: 2024-10-02

## 2024-11-22 RX ORDER — LEVOTHYROXINE SODIUM 25 UG/1
TABLET ORAL
COMMUNITY
Start: 2024-06-10

## 2024-11-22 RX ORDER — ONDANSETRON 4 MG/1
TABLET, ORALLY DISINTEGRATING ORAL
COMMUNITY
Start: 2024-03-29 | End: 2024-11-22 | Stop reason: WASHOUT

## 2024-11-22 RX ORDER — SYRINGE-NEEDLE,INSULIN,0.5 ML 28GX1/2"
SYRINGE, EMPTY DISPOSABLE MISCELLANEOUS
COMMUNITY
Start: 2024-10-02

## 2024-11-22 RX ORDER — METFORMIN HYDROCHLORIDE 500 MG/1
TABLET, EXTENDED RELEASE ORAL
COMMUNITY
Start: 2024-09-05

## 2024-11-22 RX ORDER — PROGESTERONE 50 MG/ML
INJECTION, SOLUTION INTRAMUSCULAR
COMMUNITY
Start: 2024-10-29

## 2024-11-22 RX ORDER — LETROZOLE 2.5 MG/1
TABLET, FILM COATED ORAL
COMMUNITY
End: 2024-11-22 | Stop reason: WASHOUT

## 2024-11-22 ASSESSMENT — EDINBURGH POSTNATAL DEPRESSION SCALE (EPDS)
TOTAL SCORE: 0
I HAVE BEEN SO UNHAPPY THAT I HAVE HAD DIFFICULTY SLEEPING: NOT AT ALL
THE THOUGHT OF HARMING MYSELF HAS OCCURRED TO ME: NEVER
I HAVE FELT SCARED OR PANICKY FOR NO GOOD REASON: NO, NOT AT ALL
I HAVE LOOKED FORWARD WITH ENJOYMENT TO THINGS: AS MUCH AS I EVER DID
I HAVE BLAMED MYSELF UNNECESSARILY WHEN THINGS WENT WRONG: NO, NEVER
I HAVE FELT SAD OR MISERABLE: NO, NOT AT ALL
I HAVE BEEN SO UNHAPPY THAT I HAVE BEEN CRYING: NO, NEVER
I HAVE BEEN ANXIOUS OR WORRIED FOR NO GOOD REASON: NO, NOT AT ALL
THINGS HAVE BEEN GETTING ON TOP OF ME: NO, I HAVE BEEN COPING AS WELL AS EVER
I HAVE BEEN ABLE TO LAUGH AND SEE THE FUNNY SIDE OF THINGS: AS MUCH AS I ALWAYS COULD

## 2024-11-22 NOTE — PROGRESS NOTES
Subjective   Patient ID [id number]   Sugey Dasilva is a 34 y.o.  at 9w0d with a working estimated date of delivery of 2025, by Ultrasound who presents for an initial prenatal visit.     Her pregnancy is complicated by:  -IVF pregnancy through [facility]  -Alport syndrome: patient with baseline hematuria but no other symptoms, did see nephrology at [facility]  -hypothyroidism, on synthroid    OB History    Para Term  AB Living   2       1     SAB IAB Ectopic Multiple Live Births       1          # Outcome Date GA Lbr Dhruv/2nd Weight Sex Type Anes PTL Lv   2 Current            1 Ectopic  4w0d    Biochemical        [place]  Depression Scale Total: 0    Objective   Physical Exam  Weight: 59 kg (130 lb)  Expected Total Weight Gain: 11.5 kg (25 lb)-16 kg (35 lb)   Pregravid BMI: 23.77  BP: 110/68    OBGyn Exam  Gen in NAD  US with single IUP with +FCA c/w prior US and prior IVF dates, GRIFFIN 25    Prenatal Labs  pending    Assessment/Plan   35 y/o  at 9.0 by IVF dates presenting for new ob visit.   -continue prenatal vitamin  -continue synthroid for hypothyroidism, plan TSH q trimester  -discussed genetic screening, already had carrier screening and this is a PGT tested embryo, discussed NIPS still recommended if they way genetic screening, they will think about and will draw next visit if they would like  -plan MFM consult for maternal Alport syndrome, will check baseline HELLP labs with new ob labs and baseline urine pr/cr ratio  -start ASA at 12 wks  -will schedule NT scan  -discussed flu vaccine, she will think about, follow up next visit  -recommended updated covid booster  -oriented to practice, collaborative care, [facility]  -[facility] 3 wks    Mayela Morrison MD

## 2024-11-23 PROBLEM — E03.9 HYPOTHYROIDISM: Status: ACTIVE | Noted: 2024-11-23

## 2024-11-23 PROBLEM — R11.10 INTERMITTENT VOMITING: Status: RESOLVED | Noted: 2023-08-23 | Resolved: 2024-11-23

## 2024-11-23 PROBLEM — R55 PRE-SYNCOPE: Status: RESOLVED | Noted: 2023-08-23 | Resolved: 2024-11-23

## 2024-11-23 PROBLEM — E03.8 SUBCLINICAL HYPOTHYROIDISM: Status: RESOLVED | Noted: 2023-08-23 | Resolved: 2024-11-23

## 2024-11-23 PROBLEM — R87.612 LOW GRADE SQUAMOUS INTRAEPITHELIAL LESION (LGSIL) ON CERVICAL PAP SMEAR: Status: RESOLVED | Noted: 2023-08-23 | Resolved: 2024-11-23

## 2024-11-23 PROBLEM — R53.82 CHRONIC FATIGUE: Status: RESOLVED | Noted: 2023-08-23 | Resolved: 2024-11-23

## 2024-11-23 PROBLEM — N92.6 IRREGULAR MENSES: Status: RESOLVED | Noted: 2023-08-23 | Resolved: 2024-11-23

## 2024-11-23 PROBLEM — Q87.81 ALPORT SYNDROME (HHS-HCC): Status: ACTIVE | Noted: 2024-11-23

## 2024-11-23 PROBLEM — R42 VERTIGO: Status: RESOLVED | Noted: 2023-08-23 | Resolved: 2024-11-23

## 2024-11-23 PROBLEM — Z3A.09 9 WEEKS GESTATION OF PREGNANCY (HHS-HCC): Status: ACTIVE | Noted: 2024-11-23

## 2024-11-23 PROBLEM — N92.6 MISSED MENSES: Status: RESOLVED | Noted: 2023-08-23 | Resolved: 2024-11-23

## 2024-11-23 LAB
BACTERIA UR CULT: NORMAL
C TRACH RRNA SPEC QL NAA+PROBE: NEGATIVE
N GONORRHOEA DNA SPEC QL PROBE+SIG AMP: NEGATIVE

## 2024-12-05 ENCOUNTER — LAB (OUTPATIENT)
Dept: LAB | Facility: LAB | Age: 34
End: 2024-12-05
Payer: COMMERCIAL

## 2024-12-05 DIAGNOSIS — E03.9 HYPOTHYROIDISM, UNSPECIFIED: ICD-10-CM

## 2024-12-05 LAB
ALBUMIN SERPL BCP-MCNC: 3.8 G/DL (ref 3.4–5)
ALP SERPL-CCNC: 50 U/L (ref 33–110)
ALT SERPL W P-5'-P-CCNC: 18 U/L (ref 7–45)
ANION GAP SERPL CALC-SCNC: 11 MMOL/L (ref 10–20)
AST SERPL W P-5'-P-CCNC: 13 U/L (ref 9–39)
BILIRUB SERPL-MCNC: 0.4 MG/DL (ref 0–1.2)
BUN SERPL-MCNC: 11 MG/DL (ref 6–23)
CALCIUM SERPL-MCNC: 8.8 MG/DL (ref 8.6–10.3)
CHLORIDE SERPL-SCNC: 106 MMOL/L (ref 98–107)
CO2 SERPL-SCNC: 25 MMOL/L (ref 21–32)
CREAT SERPL-MCNC: 0.68 MG/DL (ref 0.5–1.05)
EGFRCR SERPLBLD CKD-EPI 2021: >90 ML/MIN/1.73M*2
GLUCOSE SERPL-MCNC: 111 MG/DL (ref 74–99)
POTASSIUM SERPL-SCNC: 3.4 MMOL/L (ref 3.5–5.3)
PROT SERPL-MCNC: 6.3 G/DL (ref 6.4–8.2)
SODIUM SERPL-SCNC: 139 MMOL/L (ref 136–145)
T4 FREE SERPL-MCNC: 1.01 NG/DL (ref 0.61–1.12)
TSH SERPL-ACNC: 1.18 MIU/L (ref 0.44–3.98)

## 2024-12-05 PROCEDURE — 84443 ASSAY THYROID STIM HORMONE: CPT

## 2024-12-05 PROCEDURE — 36415 COLL VENOUS BLD VENIPUNCTURE: CPT

## 2024-12-05 PROCEDURE — 84439 ASSAY OF FREE THYROXINE: CPT

## 2024-12-05 PROCEDURE — 80053 COMPREHEN METABOLIC PANEL: CPT

## 2024-12-06 ENCOUNTER — TELEPHONE (OUTPATIENT)
Dept: OBSTETRICS AND GYNECOLOGY | Facility: CLINIC | Age: 34
End: 2024-12-06
Payer: COMMERCIAL

## 2024-12-11 ENCOUNTER — APPOINTMENT (OUTPATIENT)
Dept: OBSTETRICS AND GYNECOLOGY | Facility: CLINIC | Age: 34
End: 2024-12-11
Payer: COMMERCIAL

## 2024-12-11 VITALS — DIASTOLIC BLOOD PRESSURE: 89 MMHG | WEIGHT: 135 LBS | SYSTOLIC BLOOD PRESSURE: 124 MMHG | BODY MASS INDEX: 24.69 KG/M2

## 2024-12-11 DIAGNOSIS — Z3A.11 11 WEEKS GESTATION OF PREGNANCY (HHS-HCC): ICD-10-CM

## 2024-12-11 LAB
ABO GROUP (TYPE) IN BLOOD: NORMAL
ALBUMIN SERPL BCP-MCNC: 3.9 G/DL (ref 3.4–5)
ALP SERPL-CCNC: 53 U/L (ref 33–110)
ALT SERPL W P-5'-P-CCNC: 20 U/L (ref 7–45)
ANION GAP SERPL CALC-SCNC: 12 MMOL/L (ref 10–20)
ANTIBODY SCREEN: NORMAL
AST SERPL W P-5'-P-CCNC: 14 U/L (ref 9–39)
BILIRUB SERPL-MCNC: 0.4 MG/DL (ref 0–1.2)
BUN SERPL-MCNC: 10 MG/DL (ref 6–23)
CALCIUM SERPL-MCNC: 9.4 MG/DL (ref 8.6–10.3)
CHLORIDE SERPL-SCNC: 105 MMOL/L (ref 98–107)
CO2 SERPL-SCNC: 24 MMOL/L (ref 21–32)
CREAT SERPL-MCNC: 0.66 MG/DL (ref 0.5–1.05)
CREAT UR-MCNC: 34.5 MG/DL (ref 20–320)
EGFRCR SERPLBLD CKD-EPI 2021: >90 ML/MIN/1.73M*2
ERYTHROCYTE [DISTWIDTH] IN BLOOD BY AUTOMATED COUNT: 12 % (ref 11.5–14.5)
GLUCOSE SERPL-MCNC: 78 MG/DL (ref 74–99)
HCT VFR BLD AUTO: 36.1 % (ref 36–46)
HGB BLD-MCNC: 12.3 G/DL (ref 12–16)
LDH SERPL L TO P-CCNC: 175 U/L (ref 84–246)
MCH RBC QN AUTO: 30.4 PG (ref 26–34)
MCHC RBC AUTO-ENTMCNC: 34.1 G/DL (ref 32–36)
MCV RBC AUTO: 89 FL (ref 80–100)
NRBC BLD-RTO: 0 /100 WBCS (ref 0–0)
PLATELET # BLD AUTO: 249 X10*3/UL (ref 150–450)
POTASSIUM SERPL-SCNC: 4.1 MMOL/L (ref 3.5–5.3)
PROT SERPL-MCNC: 6.6 G/DL (ref 6.4–8.2)
PROT UR-ACNC: 8 MG/DL (ref 5–24)
PROT/CREAT UR: 0.23 MG/MG CREAT (ref 0–0.17)
RBC # BLD AUTO: 4.05 X10*6/UL (ref 4–5.2)
RH FACTOR (ANTIGEN D): NORMAL
SODIUM SERPL-SCNC: 137 MMOL/L (ref 136–145)
TSH SERPL-ACNC: 0.54 MIU/L (ref 0.44–3.98)
URATE SERPL-MCNC: 3.7 MG/DL (ref 2.3–6.7)
WBC # BLD AUTO: 9.1 X10*3/UL (ref 4.4–11.3)

## 2024-12-11 PROCEDURE — 84443 ASSAY THYROID STIM HORMONE: CPT

## 2024-12-11 PROCEDURE — 86900 BLOOD TYPING SEROLOGIC ABO: CPT

## 2024-12-11 PROCEDURE — 83615 LACTATE (LD) (LDH) ENZYME: CPT

## 2024-12-11 PROCEDURE — 82570 ASSAY OF URINE CREATININE: CPT

## 2024-12-11 PROCEDURE — 84156 ASSAY OF PROTEIN URINE: CPT

## 2024-12-11 PROCEDURE — 86850 RBC ANTIBODY SCREEN: CPT

## 2024-12-11 PROCEDURE — 87340 HEPATITIS B SURFACE AG IA: CPT

## 2024-12-11 PROCEDURE — 86803 HEPATITIS C AB TEST: CPT

## 2024-12-11 PROCEDURE — 86901 BLOOD TYPING SEROLOGIC RH(D): CPT

## 2024-12-11 PROCEDURE — 0501F PRENATAL FLOW SHEET: CPT | Performed by: OBSTETRICS & GYNECOLOGY

## 2024-12-11 PROCEDURE — 87389 HIV-1 AG W/HIV-1&-2 AB AG IA: CPT

## 2024-12-11 PROCEDURE — 86780 TREPONEMA PALLIDUM: CPT

## 2024-12-11 PROCEDURE — 86317 IMMUNOASSAY INFECTIOUS AGENT: CPT

## 2024-12-11 PROCEDURE — 84550 ASSAY OF BLOOD/URIC ACID: CPT

## 2024-12-11 PROCEDURE — 83036 HEMOGLOBIN GLYCOSYLATED A1C: CPT

## 2024-12-11 PROCEDURE — 85027 COMPLETE CBC AUTOMATED: CPT

## 2024-12-11 PROCEDURE — 80053 COMPREHEN METABOLIC PANEL: CPT

## 2024-12-11 NOTE — PROGRESS NOTES
Routine ob at 11.5 wks.  Feeling well.  Has MFM consult and NT scan next week.  Declines NIPS.  Plan OB labs, baseline HELLP labs, TSH today.  Will start 162 mg ASA now.  Declines flu shot but will think about it more before next visit.  RTC 4 wks.

## 2024-12-12 LAB
EST. AVERAGE GLUCOSE BLD GHB EST-MCNC: 91 MG/DL
HBA1C MFR BLD: 4.8 %
HBV SURFACE AG SERPL QL IA: NONREACTIVE
HCV AB SER QL: NONREACTIVE
HIV 1+2 AB+HIV1 P24 AG SERPL QL IA: NONREACTIVE
REFLEX ADDED, ANEMIA PANEL: NORMAL
RUBV IGG SERPL IA-ACNC: 2.1 IA
RUBV IGG SERPL QL IA: POSITIVE
TREPONEMA PALLIDUM IGG+IGM AB [PRESENCE] IN SERUM OR PLASMA BY IMMUNOASSAY: NONREACTIVE

## 2024-12-14 NOTE — PROGRESS NOTES
MFM Fellow Note: OB High-Risk Clinic  Initial Visit  2024    Ms. Sugey Dasilva is a 34 y.o.  at 12w4d by 5w6d US who presents for initial high-risk OB clinic visit. She is referred for possible history of Alport syndrome.   She was referred by Dr. Mayela Morrison MD.    Today patient has no complaints today. She denies vaginal bleeding, abnormal vaginal discharge, pelvic pain and/or cramping, loss of fluid, fevers, chills, dysuria, or other urinary issues. No nausea/vomiting.     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 They decided to pursue IVF with PGT-M and PGT-A. The fetus is a carrier of the syndrome. She has seen a nephrologist at [facility] for evaluation. She notes she has no history of renal dysfunction or proteinuria, but has had blood in her urine previously. No family hx of members with kidney disease (other than kidney stones) or on dialysis.     ROS  See HPI. 14 pt ROS otherwise negative.    Past Medical History:   Diagnosis Date    Alport syndrome (HHS-HCC)     CARRIER    History of PCOS     HPV (human papilloma virus) infection 2023    Hx of abnormal cervical Pap smear     Hypothyroidism     Seasonal allergies        Past Surgical History:   Procedure Laterality Date    ANTERIOR CRUCIATE LIGAMENT REPAIR      TONGUE SURGERY      Tongue tie repaired at 3 years old    WISDOM TOOTH EXTRACTION         OB History    Para Term  AB Living   2       1     SAB IAB Ectopic Multiple Live Births   1   0          # Outcome Date GA Lbr Dhruv/2nd Weight Sex Type Anes PTL Lv   2 Current            1 2024 4w0d    Biochemical          Social History     Socioeconomic History    Marital status:      Spouse name: Forrest    Number of children: Not on file    Years of education: Not on file    Highest education level: Not on file   Occupational History    Not on file   Tobacco Use     Smoking status: Never    Smokeless tobacco: Never   Vaping Use    Vaping status: Never Used   Substance and Sexual Activity    Alcohol use: Not Currently    Drug use: Never    Sexual activity: Yes     Partners: Male     Birth control/protection: None   Other Topics Concern    Not on file   Social History Narrative    Not on file     Social Drivers of Health     Financial Resource Strain: Not on file   Food Insecurity: Not on file   Transportation Needs: Not on file   Physical Activity: Not on file   Stress: Not on file   Social Connections: Not on file   Intimate Partner Violence: Not on file       Family History   Problem Relation Name Age of Onset    Hypertension Mother Rosa     Nephrolithiasis Father      Asthma Brother Terry     Hypertension Brother Terry     Cancer Paternal Grandfather Xiang     No Known Problems Other         Physical Exam  Vitals:    12/17/24 1326   BP: 124/80     General: NAD  CV: RRR  Respiratory: No increased work of breathing noted  Pelvic exam: deferred    NT ultrasound performed today, +FCA    Assessment and Plan:    Hypothyroidism  - synthroid 50mcg daily, 100mcg on Wed/Sat  - 12/11 TSH 0.54  - Perform TSH qTrimester    12 weeks gestation of pregnancy ([facility])  - Prenatal labs reviewed  - Dated by FET  - Genetics: declines cfDNA or other screening. NT ultrasound wnl. Performed PGT-A and PGT-M for Alport syndrome  - Can continue to follow with Dr. Morrison in this pregnancy. 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   - s/p genetics consult with Ina Aguayo in 2023 prior to IVF treatment. She had PGT-M, and this fetus is a carrier of the syndrome  - s/p consult with nephrology at [facility] 10/19/23, who reported that the patient likely has asymptomatic  Alport syndrome, but there is an autosomal dominant form that is symptomatic and can develop renal failure. BMP at that time with normal creatinine and no urine protein. There was a small amount of blood in her urine. Recommended she have annual appts with this physician  - In individuals with autosomal dominant forms, some individuals may be asymptomatic, while others have hematuria, proteinuria, and ESRD may occur in later adulthood. SNHL is relatively late in onset, and ocular involvement is rare. She has no hx of SNHL or ocular disease. Discussed that as her baseline renal function is normal, this is a good prognostic factor for outcomes in pregnancy  - Alport syndrome in pregnancy has been reported to be associated with kidney failure, preeclampsia, FGR, and  birth. Again emphasized that as her baseline labs are normal, I suspect these risks are low      Plan:   - Baseline BMP in this pregnancy WNL with creatinine 0.66, Urine P:C 0.23. Will send UA with micro for blood and 24H urine protein. Recommend qTrimester BMP/Urine P:C  - Recommended daily ASA 162mg for preE prevention (already taking)  - No history of cHTN. Will continue to monitor her Bps are her prenatal visits  - Recommend nephrology follow up in pregnancy  - If she develops HTN, renal dysfunction, or worsening proteinuria in pregnancy, please refer back to [facility]. Otherwise we anticipate her care should be able to continue with Dr. Morrison    Thank you for allowing us to participate in the care of your patient. We anticipate she should be able to continue her care under your supervision. Please do not hesitate to contact us should any concerns arise    Patient seen and discussed with Dr. Padgett.    Nikki Mora MD  Fellow, Maternal-Fetal Medicine

## 2024-12-16 PROBLEM — Z3A.12 12 WEEKS GESTATION OF PREGNANCY (HHS-HCC): Status: ACTIVE | Noted: 2024-11-23

## 2024-12-17 ENCOUNTER — INITIAL PRENATAL (OUTPATIENT)
Dept: MATERNAL FETAL MEDICINE | Facility: CLINIC | Age: 34
End: 2024-12-17
Payer: COMMERCIAL

## 2024-12-17 ENCOUNTER — HOSPITAL ENCOUNTER (OUTPATIENT)
Dept: RADIOLOGY | Facility: CLINIC | Age: 34
Discharge: HOME | End: 2024-12-17
Payer: COMMERCIAL

## 2024-12-17 VITALS — WEIGHT: 136 LBS | BODY MASS INDEX: 24.87 KG/M2 | SYSTOLIC BLOOD PRESSURE: 124 MMHG | DIASTOLIC BLOOD PRESSURE: 80 MMHG

## 2024-12-17 DIAGNOSIS — Q87.81 ALPORT SYNDROME (HHS-HCC): ICD-10-CM

## 2024-12-17 DIAGNOSIS — O09.811 PREGNANCY RESULTING FROM ASSISTED REPRODUCTIVE TECHNOLOGY IN FIRST TRIMESTER (HHS-HCC): Primary | ICD-10-CM

## 2024-12-17 DIAGNOSIS — Z36.82 ENCOUNTER FOR ANTENATAL SCREENING FOR NUCHAL TRANSLUCENCY (HHS-HCC): ICD-10-CM

## 2024-12-17 DIAGNOSIS — Z3A.12 12 WEEKS GESTATION OF PREGNANCY (HHS-HCC): ICD-10-CM

## 2024-12-17 DIAGNOSIS — O09.811 SUPERVISION OF PREGNANCY RESULTING FROM ASSISTED REPRODUCTIVE TECHNOLOGY, FIRST TRIMESTER: ICD-10-CM

## 2024-12-17 DIAGNOSIS — E03.9 HYPOTHYROID IN PREGNANCY, ANTEPARTUM (HHS-HCC): ICD-10-CM

## 2024-12-17 DIAGNOSIS — Z34.91 PRENATAL CARE IN FIRST TRIMESTER (HHS-HCC): ICD-10-CM

## 2024-12-17 DIAGNOSIS — O99.280 HYPOTHYROID IN PREGNANCY, ANTEPARTUM (HHS-HCC): ICD-10-CM

## 2024-12-17 PROCEDURE — 99213 OFFICE O/P EST LOW 20 MIN: CPT | Performed by: STUDENT IN AN ORGANIZED HEALTH CARE EDUCATION/TRAINING PROGRAM

## 2024-12-17 PROCEDURE — 76813 OB US NUCHAL MEAS 1 GEST: CPT

## 2024-12-17 PROCEDURE — 76813 OB US NUCHAL MEAS 1 GEST: CPT | Performed by: MEDICAL GENETICS

## 2024-12-17 PROCEDURE — 76801 OB US < 14 WKS SINGLE FETUS: CPT

## 2024-12-17 NOTE — Clinical Note
Hi!   Routing our note for your patient with possible asymptomatic alport vs autosomal dominant alport syndrome (she was incidentally found to be a carrier on workup for IVF). Recommending qTrimester BMP / urine P:C, and close monitoring of her Bps, along with follow up with nephrology (she wants to see a physician at [facility] she saw previously). Since her baseline labs are all normal, she likely will not need to follow up with M this pregnancy. We are happy to see her again in the 3rd trimester or at any point if her clinical status changes!   Thank you,  Nikki

## 2024-12-17 NOTE — ASSESSMENT & PLAN NOTE
- Prenatal labs reviewed  - Dated by FET  - Genetics: declines cfDNA or other screening. NT ultrasound wnl. Performed PGT-A and PGT-M for Alport syndrome  - Can continue to follow with Dr. Morrison in this pregnancy. SIM is happy to see her again in the 3rd trimester if complications present

## 2024-12-18 ENCOUNTER — APPOINTMENT (OUTPATIENT)
Dept: RADIOLOGY | Facility: CLINIC | Age: 34
End: 2024-12-18
Payer: COMMERCIAL

## 2024-12-18 DIAGNOSIS — Q87.81 ALPORT SYNDROME (HHS-HCC): ICD-10-CM

## 2024-12-18 NOTE — ASSESSMENT & PLAN NOTE
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   - s/p genetics consult with Ina Aguayo in  prior to IVF treatment. She had PGT-M, and this fetus is a carrier of the syndrome  - s/p consult with nephrology at [facility] 10/19/23, who reported that the patient likely has asymptomatic Alport syndrome, but there is an autosomal dominant form that is symptomatic and can develop renal failure. BMP at that time with normal creatinine and no urine protein. There was a small amount of blood in her urine. Recommended she have annual appts with this physician  - In individuals with autosomal dominant forms, some individuals may be asymptomatic, while others have hematuria, proteinuria, and ESRD may occur in later adulthood. SNHL is relatively late in onset, and ocular involvement is rare. She has no hx of SNHL or ocular disease. Discussed that as her baseline renal function is normal, this is a good prognostic factor for outcomes in pregnancy  - Alport syndrome in pregnancy has been reported to be associated with kidney failure, preeclampsia, FGR, and  birth. Again emphasized that as her baseline labs are normal, I suspect these risks are low      Plan:   - Baseline BMP in this pregnancy WNL with creatinine 0.66, Urine P:C 0.23. Will send UA with micro for blood and 24H urine protein. Recommend qTrimester BMP/Urine P:C  - Recommended daily ASA 162mg for preE prevention (already taking)  - No history of cHTN. Will continue to monitor her Bps are her prenatal visits  - Recommend nephrology follow up in pregnancy  - If she develops HTN, renal dysfunction, or worsening proteinuria in pregnancy, please refer back to [facility]. Otherwise we anticipate her care should be able to continue with Dr. Morrison

## 2024-12-18 NOTE — PROGRESS NOTES
LVM for pt.  Sent message through [facility].  UA canceled for incorrect container.  Another order placed for UA with micro.  Pt may submit sample when picking up 24 h urine supplies.

## 2024-12-19 ENCOUNTER — APPOINTMENT (OUTPATIENT)
Dept: RADIOLOGY | Facility: CLINIC | Age: 34
End: 2024-12-19
Payer: COMMERCIAL

## 2024-12-19 ENCOUNTER — APPOINTMENT (OUTPATIENT)
Dept: MATERNAL FETAL MEDICINE | Facility: CLINIC | Age: 34
End: 2024-12-19
Payer: COMMERCIAL

## 2025-01-07 ENCOUNTER — APPOINTMENT (OUTPATIENT)
Dept: OBSTETRICS AND GYNECOLOGY | Facility: CLINIC | Age: 35
End: 2025-01-07
Payer: COMMERCIAL

## 2025-01-07 VITALS — WEIGHT: 142 LBS | DIASTOLIC BLOOD PRESSURE: 68 MMHG | SYSTOLIC BLOOD PRESSURE: 114 MMHG | BODY MASS INDEX: 25.97 KG/M2

## 2025-01-07 DIAGNOSIS — Z3A.15 15 WEEKS GESTATION OF PREGNANCY (HHS-HCC): Primary | ICD-10-CM

## 2025-01-07 PROCEDURE — 0501F PRENATAL FLOW SHEET: CPT | Performed by: OBSTETRICS & GYNECOLOGY

## 2025-01-07 NOTE — PROGRESS NOTES
Routine ob at 15.4 wks. Feeling well.  S/p MFM consult, will complete her 24 hour urine soon.  Normal NT scan.  Has anatomy scan scheduled.  RTC 4 wks.

## 2025-02-04 ENCOUNTER — HOSPITAL ENCOUNTER (OUTPATIENT)
Dept: RADIOLOGY | Facility: CLINIC | Age: 35
Discharge: HOME | End: 2025-02-04
Payer: COMMERCIAL

## 2025-02-04 DIAGNOSIS — O35.9XX0 MATERNAL CARE FOR (SUSPECTED) FETAL ABNORMALITY AND DAMAGE, UNSPECIFIED, NOT APPLICABLE OR UNSPECIFIED: ICD-10-CM

## 2025-02-04 DIAGNOSIS — Z34.91 PRENATAL CARE IN FIRST TRIMESTER (HHS-HCC): ICD-10-CM

## 2025-02-04 PROCEDURE — 76811 OB US DETAILED SNGL FETUS: CPT

## 2025-02-04 PROCEDURE — 76811 OB US DETAILED SNGL FETUS: CPT | Performed by: MEDICAL GENETICS

## 2025-02-04 PROCEDURE — 76817 TRANSVAGINAL US OBSTETRIC: CPT | Performed by: MEDICAL GENETICS

## 2025-02-07 ENCOUNTER — APPOINTMENT (OUTPATIENT)
Dept: OBSTETRICS AND GYNECOLOGY | Facility: CLINIC | Age: 35
End: 2025-02-07
Payer: COMMERCIAL

## 2025-02-07 VITALS — DIASTOLIC BLOOD PRESSURE: 74 MMHG | BODY MASS INDEX: 26.7 KG/M2 | SYSTOLIC BLOOD PRESSURE: 109 MMHG | WEIGHT: 146 LBS

## 2025-02-07 DIAGNOSIS — Z3A.20 20 WEEKS GESTATION OF PREGNANCY (HHS-HCC): Primary | ICD-10-CM

## 2025-02-07 DIAGNOSIS — Q87.81 ALPORT SYNDROME (HHS-HCC): ICD-10-CM

## 2025-02-07 PROCEDURE — 0501F PRENATAL FLOW SHEET: CPT | Performed by: OBSTETRICS & GYNECOLOGY

## 2025-02-07 RX ORDER — MAGNESIUM OXIDE 300 MG
0.5 TABLET ORAL
COMMUNITY

## 2025-02-07 NOTE — PROGRESS NOTES
Routine ob at 20.0 wks.  Feeling well.  Feeling some fetal movement.  Anatomy scan with possible previa (was having a RONY segment contraction) and needs to go back for follow up views which are scheduled in 2 wks.  Discussed pelvic rest until it is settled on whether she has a previa or not.  Will do glucola next visit as long as >24 wks, if not, will do visit after.  Will also need TSH and cmp then and repeat urine pr/cr ratio.  RTC 4 wks.

## 2025-02-12 ENCOUNTER — LAB (OUTPATIENT)
Dept: LAB | Facility: HOSPITAL | Age: 35
End: 2025-02-12
Payer: COMMERCIAL

## 2025-02-12 DIAGNOSIS — Q87.81 ALPORT SYNDROME (HHS-HCC): ICD-10-CM

## 2025-02-13 LAB
CREAT 24H UR-MRATE: 1.13 G/24 H (ref 0.5–2.15)
PROT 24H UR-MRATE: 138 MG/24 H
PROT/CREAT 24H UR: 0.12 MG/MG CREAT
PROT/CREAT 24H UR: 122 MG/G CREAT

## 2025-02-20 ENCOUNTER — HOSPITAL ENCOUNTER (OUTPATIENT)
Dept: RADIOLOGY | Facility: CLINIC | Age: 35
Discharge: HOME | End: 2025-02-20
Payer: COMMERCIAL

## 2025-02-20 DIAGNOSIS — Z36.2 ENCOUNTER FOR FOLLOW-UP ULTRASOUND OF FETAL ANATOMY: ICD-10-CM

## 2025-02-20 DIAGNOSIS — O09.819 ENCOUNTER FOR SUPERVISION OF PREGNANCY RESULTING FROM ASSISTED REPRODUCTIVE TECHNOLOGY: ICD-10-CM

## 2025-02-20 DIAGNOSIS — Z34.91 PRENATAL CARE IN FIRST TRIMESTER (HHS-HCC): ICD-10-CM

## 2025-02-20 PROCEDURE — 76816 OB US FOLLOW-UP PER FETUS: CPT

## 2025-02-21 PROBLEM — O44.40 LOW-LYING PLACENTA (HHS-HCC): Status: ACTIVE | Noted: 2025-02-21

## 2025-02-26 ENCOUNTER — TELEPHONE (OUTPATIENT)
Dept: OBSTETRICS AND GYNECOLOGY | Facility: CLINIC | Age: 35
End: 2025-02-26
Payer: COMMERCIAL

## 2025-02-26 NOTE — TELEPHONE ENCOUNTER
Patient Called asking if someone could call and go over the most recent ultrasound with her. Please advise.

## 2025-03-07 ENCOUNTER — APPOINTMENT (OUTPATIENT)
Dept: OBSTETRICS AND GYNECOLOGY | Facility: CLINIC | Age: 35
End: 2025-03-07
Payer: COMMERCIAL

## 2025-03-11 ENCOUNTER — APPOINTMENT (OUTPATIENT)
Dept: OBSTETRICS AND GYNECOLOGY | Facility: CLINIC | Age: 35
End: 2025-03-11
Payer: COMMERCIAL

## 2025-03-11 VITALS — DIASTOLIC BLOOD PRESSURE: 81 MMHG | WEIGHT: 150 LBS | BODY MASS INDEX: 27.44 KG/M2 | SYSTOLIC BLOOD PRESSURE: 127 MMHG

## 2025-03-11 DIAGNOSIS — Z34.92 PRENATAL CARE IN SECOND TRIMESTER (HHS-HCC): ICD-10-CM

## 2025-03-11 DIAGNOSIS — Z3A.24 24 WEEKS GESTATION OF PREGNANCY (HHS-HCC): Primary | ICD-10-CM

## 2025-03-11 DIAGNOSIS — E03.9 HYPOTHYROIDISM, UNSPECIFIED TYPE: ICD-10-CM

## 2025-03-11 DIAGNOSIS — Z13.1 SCREENING FOR DIABETES MELLITUS: ICD-10-CM

## 2025-03-11 LAB
ERYTHROCYTE [DISTWIDTH] IN BLOOD BY AUTOMATED COUNT: 13.9 % (ref 11.5–14.5)
HCT VFR BLD AUTO: 35.9 % (ref 36–46)
HGB BLD-MCNC: 12 G/DL (ref 12–16)
MCH RBC QN AUTO: 29.6 PG (ref 26–34)
MCHC RBC AUTO-ENTMCNC: 33.4 G/DL (ref 32–36)
MCV RBC AUTO: 89 FL (ref 80–100)
NRBC BLD-RTO: 0 /100 WBCS (ref 0–0)
PLATELET # BLD AUTO: 205 X10*3/UL (ref 150–450)
RBC # BLD AUTO: 4.05 X10*6/UL (ref 4–5.2)
WBC # BLD AUTO: 8.8 X10*3/UL (ref 4.4–11.3)

## 2025-03-11 PROCEDURE — 0501F PRENATAL FLOW SHEET: CPT | Performed by: OBSTETRICS & GYNECOLOGY

## 2025-03-11 PROCEDURE — 85027 COMPLETE CBC AUTOMATED: CPT

## 2025-03-11 NOTE — PROGRESS NOTES
Routine ob at 24.4 wks.  Overall feeling well. Good FM.  Has  LLP on follow up scan, has another scan 3/20.  1 hour, TSH and CMP today as well as repeat urine pr/cr ratio.  RTC 4 wks, tdap then.

## 2025-03-12 LAB — REFLEX ADDED, ANEMIA PANEL: NORMAL

## 2025-03-13 LAB
ALBUMIN SERPL-MCNC: 3.8 G/DL (ref 3.6–5.1)
ALP SERPL-CCNC: 54 U/L (ref 31–125)
ALT SERPL-CCNC: 10 U/L (ref 6–29)
ANION GAP SERPL CALCULATED.4IONS-SCNC: 11 MMOL/L (CALC) (ref 7–17)
AST SERPL-CCNC: 12 U/L (ref 10–30)
BILIRUB SERPL-MCNC: 0.3 MG/DL (ref 0.2–1.2)
BUN SERPL-MCNC: 10 MG/DL (ref 7–25)
CALCIUM SERPL-MCNC: 9 MG/DL (ref 8.6–10.2)
CHLORIDE SERPL-SCNC: 108 MMOL/L (ref 98–110)
CO2 SERPL-SCNC: 20 MMOL/L (ref 20–32)
CREAT SERPL-MCNC: 0.74 MG/DL (ref 0.5–0.97)
CREAT UR-MCNC: 56 MG/DL (ref 20–275)
EGFRCR SERPLBLD CKD-EPI 2021: 109 ML/MIN/1.73M2
GLUCOSE 1H P 50 G GLC PO SERPL-MCNC: 107 MG/DL
GLUCOSE SERPL-MCNC: 118 MG/DL (ref 65–99)
POTASSIUM SERPL-SCNC: 3.4 MMOL/L (ref 3.5–5.3)
PROT SERPL-MCNC: 6.3 G/DL (ref 6.1–8.1)
PROT UR-MCNC: 9 MG/DL (ref 5–24)
PROT/CREAT UR: 0.16 MG/MG CREAT (ref 0.02–0.18)
PROT/CREAT UR: 161 MG/G CREAT (ref 24–184)
SODIUM SERPL-SCNC: 139 MMOL/L (ref 135–146)
TSH SERPL-ACNC: 1.98 MIU/L

## 2025-03-20 ENCOUNTER — HOSPITAL ENCOUNTER (OUTPATIENT)
Dept: RADIOLOGY | Facility: CLINIC | Age: 35
Discharge: HOME | End: 2025-03-20
Payer: COMMERCIAL

## 2025-03-20 DIAGNOSIS — Z34.91 PRENATAL CARE IN FIRST TRIMESTER (HHS-HCC): ICD-10-CM

## 2025-03-20 PROCEDURE — 76816 OB US FOLLOW-UP PER FETUS: CPT

## 2025-03-20 PROCEDURE — 76817 TRANSVAGINAL US OBSTETRIC: CPT

## 2025-03-21 ENCOUNTER — TELEPHONE (OUTPATIENT)
Dept: OBSTETRICS AND GYNECOLOGY | Facility: CLINIC | Age: 35
End: 2025-03-21
Payer: COMMERCIAL

## 2025-03-21 NOTE — TELEPHONE ENCOUNTER
Patient called because she has some questions the ultrasound she had yesterday and would like a call to discuss.

## 2025-04-09 ENCOUNTER — APPOINTMENT (OUTPATIENT)
Dept: OBSTETRICS AND GYNECOLOGY | Facility: CLINIC | Age: 35
End: 2025-04-09
Payer: COMMERCIAL

## 2025-04-09 VITALS — DIASTOLIC BLOOD PRESSURE: 85 MMHG | BODY MASS INDEX: 28.72 KG/M2 | SYSTOLIC BLOOD PRESSURE: 134 MMHG | WEIGHT: 157 LBS

## 2025-04-09 DIAGNOSIS — Z3A.28 28 WEEKS GESTATION OF PREGNANCY (HHS-HCC): Primary | ICD-10-CM

## 2025-04-09 DIAGNOSIS — Z23 ENCOUNTER FOR VACCINATION: ICD-10-CM

## 2025-04-09 PROCEDURE — 0501F PRENATAL FLOW SHEET: CPT | Performed by: OBSTETRICS & GYNECOLOGY

## 2025-04-09 PROCEDURE — 90715 TDAP VACCINE 7 YRS/> IM: CPT | Performed by: OBSTETRICS & GYNECOLOGY

## 2025-04-09 PROCEDURE — 90471 IMMUNIZATION ADMIN: CPT | Performed by: OBSTETRICS & GYNECOLOGY

## 2025-04-09 NOTE — PROGRESS NOTES
Routine ob at 28.5 wks.  Feeling well. Good FM.  Tdap today.  Rh+.  Normal 1 hour, TSH and CBC last visit along with bnp and urine pr/cr ratio.  Has 30 wk growth scan for LLP.  RTC 2 wks.

## 2025-04-16 ENCOUNTER — HOSPITAL ENCOUNTER (OUTPATIENT)
Dept: RADIOLOGY | Facility: CLINIC | Age: 35
Discharge: HOME | End: 2025-04-16
Payer: COMMERCIAL

## 2025-04-16 DIAGNOSIS — Z34.91 PRENATAL CARE IN FIRST TRIMESTER: ICD-10-CM

## 2025-04-16 PROCEDURE — 76817 TRANSVAGINAL US OBSTETRIC: CPT

## 2025-04-16 PROCEDURE — 76816 OB US FOLLOW-UP PER FETUS: CPT

## 2025-04-22 ENCOUNTER — HOSPITAL ENCOUNTER (OUTPATIENT)
Dept: RADIOLOGY | Facility: CLINIC | Age: 35
Discharge: HOME | End: 2025-04-22
Payer: COMMERCIAL

## 2025-04-22 DIAGNOSIS — Z34.91 PRENATAL CARE IN FIRST TRIMESTER: ICD-10-CM

## 2025-04-22 PROCEDURE — 76815 OB US LIMITED FETUS(S): CPT

## 2025-04-22 PROCEDURE — 76820 UMBILICAL ARTERY ECHO: CPT

## 2025-04-23 ENCOUNTER — APPOINTMENT (OUTPATIENT)
Dept: OBSTETRICS AND GYNECOLOGY | Facility: CLINIC | Age: 35
End: 2025-04-23
Payer: COMMERCIAL

## 2025-04-23 VITALS — WEIGHT: 159 LBS | BODY MASS INDEX: 29.08 KG/M2 | SYSTOLIC BLOOD PRESSURE: 110 MMHG | DIASTOLIC BLOOD PRESSURE: 74 MMHG

## 2025-04-23 DIAGNOSIS — O36.5990 FETAL GROWTH RESTRICTION ANTEPARTUM (HHS-HCC): ICD-10-CM

## 2025-04-23 DIAGNOSIS — Z3A.30 30 WEEKS GESTATION OF PREGNANCY (HHS-HCC): Primary | ICD-10-CM

## 2025-04-23 PROCEDURE — 0501F PRENATAL FLOW SHEET: CPT | Performed by: OBSTETRICS & GYNECOLOGY

## 2025-04-23 NOTE — PROGRESS NOTES
Routine ob at 30.5 wks.  Newly dx FGR on 30 wk scan, EFW 3% with AC 5%.  Has weekly testing scheduled.  Placenta no longer low lying.   Discussed FGR dx in detail today, discussed delivery b/w 38-39 wks, discussed if <3% on next growth, would need twice weekly monitoring and delivery in 37th week.  Good FM.  RTC 2 wks.

## 2025-04-25 ENCOUNTER — TELEPHONE (OUTPATIENT)
Dept: OBSTETRICS AND GYNECOLOGY | Facility: CLINIC | Age: 35
End: 2025-04-25

## 2025-04-25 ENCOUNTER — APPOINTMENT (OUTPATIENT)
Dept: OBSTETRICS AND GYNECOLOGY | Facility: CLINIC | Age: 35
End: 2025-04-25
Payer: COMMERCIAL

## 2025-04-25 NOTE — TELEPHONE ENCOUNTER
31.0 wk ob left message with questions about what allergy eye drops are ok to use in pregnancy.    Please advise

## 2025-04-30 ENCOUNTER — HOSPITAL ENCOUNTER (OUTPATIENT)
Dept: RADIOLOGY | Facility: CLINIC | Age: 35
Discharge: HOME | End: 2025-04-30
Payer: COMMERCIAL

## 2025-04-30 DIAGNOSIS — O36.5930 MATERNAL CARE FOR OTHER KNOWN OR SUSPECTED POOR FETAL GROWTH, THIRD TRIMESTER, NOT APPLICABLE OR UNSPECIFIED: ICD-10-CM

## 2025-04-30 DIAGNOSIS — O99.213 OBESITY COMPLICATING PREGNANCY, THIRD TRIMESTER (HHS-HCC): ICD-10-CM

## 2025-04-30 DIAGNOSIS — Z34.91 PRENATAL CARE IN FIRST TRIMESTER: ICD-10-CM

## 2025-04-30 PROCEDURE — 76820 UMBILICAL ARTERY ECHO: CPT

## 2025-04-30 PROCEDURE — 76815 OB US LIMITED FETUS(S): CPT

## 2025-05-07 ENCOUNTER — HOSPITAL ENCOUNTER (OUTPATIENT)
Dept: RADIOLOGY | Facility: CLINIC | Age: 35
Discharge: HOME | End: 2025-05-07
Payer: COMMERCIAL

## 2025-05-07 ENCOUNTER — APPOINTMENT (OUTPATIENT)
Dept: OBSTETRICS AND GYNECOLOGY | Facility: CLINIC | Age: 35
End: 2025-05-07
Payer: COMMERCIAL

## 2025-05-07 VITALS — WEIGHT: 162 LBS | DIASTOLIC BLOOD PRESSURE: 74 MMHG | SYSTOLIC BLOOD PRESSURE: 111 MMHG | BODY MASS INDEX: 29.63 KG/M2

## 2025-05-07 DIAGNOSIS — O36.5990 FETAL GROWTH RESTRICTION ANTEPARTUM (HHS-HCC): ICD-10-CM

## 2025-05-07 DIAGNOSIS — Z3A.32 32 WEEKS GESTATION OF PREGNANCY (HHS-HCC): Primary | ICD-10-CM

## 2025-05-07 DIAGNOSIS — Z34.91 PRENATAL CARE IN FIRST TRIMESTER: ICD-10-CM

## 2025-05-07 PROCEDURE — 0501F PRENATAL FLOW SHEET: CPT | Performed by: OBSTETRICS & GYNECOLOGY

## 2025-05-07 PROCEDURE — 76816 OB US FOLLOW-UP PER FETUS: CPT

## 2025-05-07 PROCEDURE — 76820 UMBILICAL ARTERY ECHO: CPT

## 2025-05-07 NOTE — PROGRESS NOTES
Routine ob at 32.5 wks with FGR.  Overall feeling well.  Good FM.  Has US today, due for growth, aware of under 3% then will increase monitoring to twice weekly.  RTC 2 wks.

## 2025-05-12 ENCOUNTER — TELEPHONE (OUTPATIENT)
Dept: OBSTETRICS AND GYNECOLOGY | Facility: CLINIC | Age: 35
End: 2025-05-12
Payer: COMMERCIAL
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
## 2025-05-14 ENCOUNTER — HOSPITAL ENCOUNTER (OUTPATIENT)
Dept: RADIOLOGY | Facility: CLINIC | Age: 35
Discharge: HOME | End: 2025-05-14
Payer: COMMERCIAL

## 2025-05-14 DIAGNOSIS — Z34.91 PRENATAL CARE IN FIRST TRIMESTER: ICD-10-CM

## 2025-05-14 PROCEDURE — 76815 OB US LIMITED FETUS(S): CPT

## 2025-05-14 PROCEDURE — 76820 UMBILICAL ARTERY ECHO: CPT

## 2025-05-20 ENCOUNTER — APPOINTMENT (OUTPATIENT)
Dept: OBSTETRICS AND GYNECOLOGY | Facility: CLINIC | Age: 35
End: 2025-05-20
Payer: COMMERCIAL

## 2025-05-20 VITALS — SYSTOLIC BLOOD PRESSURE: 112 MMHG | WEIGHT: 163 LBS | DIASTOLIC BLOOD PRESSURE: 62 MMHG | BODY MASS INDEX: 29.81 KG/M2

## 2025-05-20 DIAGNOSIS — Z3A.34 34 WEEKS GESTATION OF PREGNANCY (HHS-HCC): ICD-10-CM

## 2025-05-20 DIAGNOSIS — Q87.81 ALPORT SYNDROME (HHS-HCC): Primary | ICD-10-CM

## 2025-05-20 PROCEDURE — 0501F PRENATAL FLOW SHEET: CPT | Performed by: OBSTETRICS & GYNECOLOGY

## 2025-05-20 NOTE — PROGRESS NOTES
Routine ob at 34.4 wks with FGR, 8% now on last scan.  Good FM. Has weekly bpps scheduled, next one is tomorrow.  Bp remains normal.  RTC 2 wks, gbs/sve and repeat TSH/bmp/urine pr/cr ratio then.

## 2025-05-21 ENCOUNTER — HOSPITAL ENCOUNTER (OUTPATIENT)
Dept: RADIOLOGY | Facility: CLINIC | Age: 35
Discharge: HOME | End: 2025-05-21
Payer: COMMERCIAL

## 2025-05-21 DIAGNOSIS — Z34.91 PRENATAL CARE IN FIRST TRIMESTER: ICD-10-CM

## 2025-05-21 PROCEDURE — 76815 OB US LIMITED FETUS(S): CPT

## 2025-05-21 PROCEDURE — 76820 UMBILICAL ARTERY ECHO: CPT

## 2025-05-28 ENCOUNTER — ROUTINE PRENATAL (OUTPATIENT)
Dept: OBSTETRICS AND GYNECOLOGY | Facility: CLINIC | Age: 35
End: 2025-05-28
Payer: COMMERCIAL

## 2025-05-28 VITALS — BODY MASS INDEX: 30.14 KG/M2 | DIASTOLIC BLOOD PRESSURE: 62 MMHG | SYSTOLIC BLOOD PRESSURE: 111 MMHG | WEIGHT: 164.8 LBS

## 2025-05-28 DIAGNOSIS — N89.8 VAGINAL ITCHING: ICD-10-CM

## 2025-05-28 DIAGNOSIS — N89.8 VAGINAL DISCHARGE: Primary | ICD-10-CM

## 2025-05-28 DIAGNOSIS — Z3A.35 35 WEEKS GESTATION OF PREGNANCY (HHS-HCC): ICD-10-CM

## 2025-05-28 DIAGNOSIS — Z34.83 MULTIGRAVIDA IN THIRD TRIMESTER (HHS-HCC): ICD-10-CM

## 2025-05-28 DIAGNOSIS — B37.31 VULVOVAGINAL CANDIDIASIS: ICD-10-CM

## 2025-05-28 PROBLEM — Z3A.34 34 WEEKS GESTATION OF PREGNANCY (HHS-HCC): Status: RESOLVED | Noted: 2024-11-23 | Resolved: 2025-05-28

## 2025-05-28 PROCEDURE — 0501F PRENATAL FLOW SHEET: CPT | Performed by: MIDWIFE

## 2025-05-28 RX ORDER — TERCONAZOLE 4 MG/G
1 CREAM VAGINAL NIGHTLY
Qty: 1 G | Refills: 0 | Status: SHIPPED | OUTPATIENT
Start: 2025-05-28 | End: 2025-06-04

## 2025-05-28 NOTE — PROGRESS NOTES
S: Problem visit for vaginal itching with discharge. Patient reports she's had off and on symptoms for the past couple weeks, but within the last day or two she's noticed thick whitish-yellowish discharge. States her symptoms are reminiscent of yeast infection. Denies recent antibiotic use.    O: See flow sheets       SSE: Thick, clumpy yellowish/whitish discharge noted throughout vagina    A: 33yo  at 35.5 per 5.6 week US      VVC    P: Reviewed BP, weight      Treated presumptively for VVC with terazole. Recommended use at night for maximum absorption and to use pad in the morning for residual discharge      Questions answered, patient verbalizes understanding and agrees to this POC      MARIANNE as scheduled

## 2025-05-29 ENCOUNTER — HOSPITAL ENCOUNTER (OUTPATIENT)
Dept: RADIOLOGY | Facility: CLINIC | Age: 35
Discharge: HOME | End: 2025-05-29
Payer: COMMERCIAL

## 2025-05-29 DIAGNOSIS — Z34.91 PRENATAL CARE IN FIRST TRIMESTER: ICD-10-CM

## 2025-05-29 DIAGNOSIS — O36.5930 MATERNAL CARE FOR OTHER KNOWN OR SUSPECTED POOR FETAL GROWTH, THIRD TRIMESTER, NOT APPLICABLE OR UNSPECIFIED: ICD-10-CM

## 2025-05-29 PROCEDURE — 76820 UMBILICAL ARTERY ECHO: CPT

## 2025-05-29 PROCEDURE — 76816 OB US FOLLOW-UP PER FETUS: CPT

## 2025-06-02 ENCOUNTER — APPOINTMENT (OUTPATIENT)
Dept: OBSTETRICS AND GYNECOLOGY | Facility: CLINIC | Age: 35
End: 2025-06-02
Payer: COMMERCIAL

## 2025-06-02 VITALS — SYSTOLIC BLOOD PRESSURE: 114 MMHG | WEIGHT: 164 LBS | DIASTOLIC BLOOD PRESSURE: 79 MMHG | BODY MASS INDEX: 30 KG/M2

## 2025-06-02 DIAGNOSIS — O36.5990 FETAL GROWTH RESTRICTION ANTEPARTUM (HHS-HCC): ICD-10-CM

## 2025-06-02 DIAGNOSIS — E03.9 HYPOTHYROIDISM, UNSPECIFIED TYPE: ICD-10-CM

## 2025-06-02 DIAGNOSIS — Z3A.36 36 WEEKS GESTATION OF PREGNANCY (HHS-HCC): Primary | ICD-10-CM

## 2025-06-02 PROCEDURE — 0501F PRENATAL FLOW SHEET: CPT | Performed by: OBSTETRICS & GYNECOLOGY

## 2025-06-02 NOTE — PROGRESS NOTES
Routine ob at 36.3 wks.  Feeling well. Good FM.  EFW 5%.  GBS done.  SVE 1/50/-2, vertex.  Discussed IOL b/w 38-39 wks for FGR, she will think about dates and plan to book next visit.  Has bpp later this week.  BMP/TSH drawn today as well.  RTC 1 wk.

## 2025-06-03 ENCOUNTER — TELEPHONE (OUTPATIENT)
Dept: OBSTETRICS AND GYNECOLOGY | Facility: CLINIC | Age: 35
End: 2025-06-03

## 2025-06-03 ENCOUNTER — PREP FOR PROCEDURE (OUTPATIENT)
Dept: OBSTETRICS AND GYNECOLOGY | Facility: HOSPITAL | Age: 35
End: 2025-06-03
Payer: COMMERCIAL

## 2025-06-03 DIAGNOSIS — Z3A.38 38 WEEKS GESTATION OF PREGNANCY (HHS-HCC): Primary | ICD-10-CM

## 2025-06-03 PROBLEM — Z3A.36 36 WEEKS GESTATION OF PREGNANCY (HHS-HCC): Status: ACTIVE | Noted: 2025-05-28

## 2025-06-03 NOTE — TELEPHONE ENCOUNTER
Pt was seen yesterday and discussed induction dates with during appt. She is wanting to be induced on June 13th.. if that doesn't work then the following day, June 14th is what she is thinking.

## 2025-06-05 ENCOUNTER — HOSPITAL ENCOUNTER (OUTPATIENT)
Dept: RADIOLOGY | Facility: CLINIC | Age: 35
Discharge: HOME | End: 2025-06-05
Payer: COMMERCIAL

## 2025-06-05 DIAGNOSIS — Z34.91 PRENATAL CARE IN FIRST TRIMESTER: ICD-10-CM

## 2025-06-05 DIAGNOSIS — O36.5930 MATERNAL CARE FOR OTHER KNOWN OR SUSPECTED POOR FETAL GROWTH, THIRD TRIMESTER, NOT APPLICABLE OR UNSPECIFIED: ICD-10-CM

## 2025-06-05 DIAGNOSIS — O99.283 ENDOCRINE, NUTRITIONAL AND METABOLIC DISEASES COMPLICATING PREGNANCY, THIRD TRIMESTER (HHS-HCC): ICD-10-CM

## 2025-06-05 DIAGNOSIS — O09.03 SUPERVISION OF PREGNANCY WITH HISTORY OF INFERTILITY, THIRD TRIMESTER: ICD-10-CM

## 2025-06-05 LAB
ANION GAP SERPL CALCULATED.4IONS-SCNC: 8 MMOL/L (CALC) (ref 7–17)
BUN SERPL-MCNC: 10 MG/DL (ref 7–25)
BUN/CREAT SERPL: NORMAL (CALC) (ref 6–22)
CALCIUM SERPL-MCNC: 8.8 MG/DL (ref 8.6–10.2)
CHLORIDE SERPL-SCNC: 107 MMOL/L (ref 98–110)
CO2 SERPL-SCNC: 23 MMOL/L (ref 20–32)
CREAT SERPL-MCNC: 0.69 MG/DL (ref 0.5–0.97)
CREAT UR-MCNC: 72 MG/DL (ref 20–275)
EGFRCR SERPLBLD CKD-EPI 2021: 117 ML/MIN/1.73M2
GLUCOSE SERPL-MCNC: 70 MG/DL (ref 65–99)
GP B STREP SPEC QL CULT: NORMAL
POTASSIUM SERPL-SCNC: 4 MMOL/L (ref 3.5–5.3)
PROT UR-MCNC: 10 MG/DL (ref 5–24)
PROT/CREAT UR: 0.14 MG/MG CREAT (ref 0.02–0.18)
PROT/CREAT UR: 139 MG/G CREAT (ref 24–184)
SODIUM SERPL-SCNC: 138 MMOL/L (ref 135–146)
TSH SERPL-ACNC: 1.03 MIU/L

## 2025-06-05 PROCEDURE — 76819 FETAL BIOPHYS PROFIL W/O NST: CPT

## 2025-06-05 PROCEDURE — 76815 OB US LIMITED FETUS(S): CPT

## 2025-06-05 PROCEDURE — 76819 FETAL BIOPHYS PROFIL W/O NST: CPT | Performed by: MEDICAL GENETICS

## 2025-06-05 PROCEDURE — 76820 UMBILICAL ARTERY ECHO: CPT | Performed by: MEDICAL GENETICS

## 2025-06-05 PROCEDURE — 76815 OB US LIMITED FETUS(S): CPT | Performed by: MEDICAL GENETICS

## 2025-06-11 ENCOUNTER — APPOINTMENT (OUTPATIENT)
Dept: OBSTETRICS AND GYNECOLOGY | Facility: CLINIC | Age: 35
End: 2025-06-11
Payer: COMMERCIAL

## 2025-06-11 ENCOUNTER — APPOINTMENT (OUTPATIENT)
Dept: RADIOLOGY | Facility: CLINIC | Age: 35
End: 2025-06-11
Payer: COMMERCIAL

## 2025-06-11 ENCOUNTER — HOSPITAL ENCOUNTER (OUTPATIENT)
Dept: RADIOLOGY | Facility: CLINIC | Age: 35
Discharge: HOME | End: 2025-06-11
Payer: COMMERCIAL

## 2025-06-11 VITALS — BODY MASS INDEX: 30.73 KG/M2 | DIASTOLIC BLOOD PRESSURE: 86 MMHG | WEIGHT: 168 LBS | SYSTOLIC BLOOD PRESSURE: 131 MMHG

## 2025-06-11 DIAGNOSIS — Z34.91 PRENATAL CARE IN FIRST TRIMESTER: ICD-10-CM

## 2025-06-11 DIAGNOSIS — O36.5990 FETAL GROWTH RESTRICTION ANTEPARTUM (HHS-HCC): ICD-10-CM

## 2025-06-11 DIAGNOSIS — Z3A.37 37 WEEKS GESTATION OF PREGNANCY (HHS-HCC): Primary | ICD-10-CM

## 2025-06-11 PROCEDURE — 0501F PRENATAL FLOW SHEET: CPT | Performed by: OBSTETRICS & GYNECOLOGY

## 2025-06-11 PROCEDURE — 76820 UMBILICAL ARTERY ECHO: CPT

## 2025-06-11 PROCEDURE — 76815 OB US LIMITED FETUS(S): CPT

## 2025-06-11 NOTE — PROGRESS NOTES
Routine ob at 37.5 wks.  Feeling well. Good FM.  BPP 8/8 with normal TIEN and UAD earlier today. Has IOL on 6/13 at 8 pm at 38.0 for FGR.  Reviewed this in detail today.  RTC for postpartum visit.

## 2025-06-13 ENCOUNTER — APPOINTMENT (OUTPATIENT)
Dept: OBSTETRICS AND GYNECOLOGY | Facility: HOSPITAL | Age: 35
End: 2025-06-13
Payer: COMMERCIAL

## 2025-06-13 ENCOUNTER — HOSPITAL ENCOUNTER (INPATIENT)
Facility: HOSPITAL | Age: 35
LOS: 3 days | Discharge: HOME | End: 2025-06-16
Attending: OBSTETRICS & GYNECOLOGY | Admitting: OBSTETRICS & GYNECOLOGY
Payer: COMMERCIAL

## 2025-06-13 ENCOUNTER — ANESTHESIA EVENT (OUTPATIENT)
Dept: OBSTETRICS AND GYNECOLOGY | Facility: HOSPITAL | Age: 35
End: 2025-06-13
Payer: COMMERCIAL

## 2025-06-13 ENCOUNTER — ANESTHESIA (OUTPATIENT)
Dept: OBSTETRICS AND GYNECOLOGY | Facility: HOSPITAL | Age: 35
End: 2025-06-13
Payer: COMMERCIAL

## 2025-06-13 DIAGNOSIS — R52 PAIN: Primary | ICD-10-CM

## 2025-06-13 DIAGNOSIS — Z3A.38 38 WEEKS GESTATION OF PREGNANCY (HHS-HCC): ICD-10-CM

## 2025-06-13 DIAGNOSIS — K59.03 DRUG-INDUCED CONSTIPATION: ICD-10-CM

## 2025-06-13 LAB
ABO GROUP (TYPE) IN BLOOD: NORMAL
ANION GAP SERPL CALC-SCNC: 14 MMOL/L (ref 10–20)
ANTIBODY SCREEN: NORMAL
BUN SERPL-MCNC: 12 MG/DL (ref 6–23)
CALCIUM SERPL-MCNC: 8.7 MG/DL (ref 8.6–10.3)
CHLORIDE SERPL-SCNC: 105 MMOL/L (ref 98–107)
CO2 SERPL-SCNC: 21 MMOL/L (ref 21–32)
CREAT SERPL-MCNC: 0.64 MG/DL (ref 0.5–1.05)
EGFRCR SERPLBLD CKD-EPI 2021: >90 ML/MIN/1.73M*2
ERYTHROCYTE [DISTWIDTH] IN BLOOD BY AUTOMATED COUNT: 13.4 % (ref 11.5–14.5)
GLUCOSE SERPL-MCNC: 101 MG/DL (ref 74–99)
HCT VFR BLD AUTO: 36.1 % (ref 36–46)
HGB BLD-MCNC: 13 G/DL (ref 12–16)
HOLD SPECIMEN: NORMAL
MCH RBC QN AUTO: 30.7 PG (ref 26–34)
MCHC RBC AUTO-ENTMCNC: 36 G/DL (ref 32–36)
MCV RBC AUTO: 85 FL (ref 80–100)
NRBC BLD-RTO: 0 /100 WBCS (ref 0–0)
PLATELET # BLD AUTO: 206 X10*3/UL (ref 150–450)
POTASSIUM SERPL-SCNC: 3.6 MMOL/L (ref 3.5–5.3)
RBC # BLD AUTO: 4.23 X10*6/UL (ref 4–5.2)
RH FACTOR (ANTIGEN D): NORMAL
SODIUM SERPL-SCNC: 136 MMOL/L (ref 136–145)
WBC # BLD AUTO: 9.6 X10*3/UL (ref 4.4–11.3)

## 2025-06-13 PROCEDURE — 7210000002 HC LABOR PER HOUR

## 2025-06-13 PROCEDURE — 59050 FETAL MONITOR W/REPORT: CPT

## 2025-06-13 PROCEDURE — 80048 BASIC METABOLIC PNL TOTAL CA: CPT | Performed by: OBSTETRICS & GYNECOLOGY

## 2025-06-13 PROCEDURE — 1120000001 HC OB PRIVATE ROOM DAILY

## 2025-06-13 PROCEDURE — 36415 COLL VENOUS BLD VENIPUNCTURE: CPT | Performed by: OBSTETRICS & GYNECOLOGY

## 2025-06-13 PROCEDURE — 86901 BLOOD TYPING SEROLOGIC RH(D): CPT | Performed by: OBSTETRICS & GYNECOLOGY

## 2025-06-13 PROCEDURE — 2500000001 HC RX 250 WO HCPCS SELF ADMINISTERED DRUGS (ALT 637 FOR MEDICARE OP)

## 2025-06-13 PROCEDURE — 86780 TREPONEMA PALLIDUM: CPT | Mod: STJLAB | Performed by: OBSTETRICS & GYNECOLOGY

## 2025-06-13 PROCEDURE — 85027 COMPLETE CBC AUTOMATED: CPT | Performed by: OBSTETRICS & GYNECOLOGY

## 2025-06-13 RX ORDER — SODIUM CHLORIDE, SODIUM LACTATE, POTASSIUM CHLORIDE, CALCIUM CHLORIDE 600; 310; 30; 20 MG/100ML; MG/100ML; MG/100ML; MG/100ML
75 INJECTION, SOLUTION INTRAVENOUS CONTINUOUS
Status: ACTIVE | OUTPATIENT
Start: 2025-06-13 | End: 2025-06-15

## 2025-06-13 RX ORDER — OXYTOCIN/0.9 % SODIUM CHLORIDE 30/500 ML
60 PLASTIC BAG, INJECTION (ML) INTRAVENOUS ONCE AS NEEDED
Status: DISCONTINUED | OUTPATIENT
Start: 2025-06-13 | End: 2025-06-14 | Stop reason: HOSPADM

## 2025-06-13 RX ORDER — LABETALOL HYDROCHLORIDE 5 MG/ML
20 INJECTION, SOLUTION INTRAVENOUS ONCE AS NEEDED
Status: DISCONTINUED | OUTPATIENT
Start: 2025-06-13 | End: 2025-06-14 | Stop reason: SDUPTHER

## 2025-06-13 RX ORDER — OXYTOCIN 10 [USP'U]/ML
10 INJECTION, SOLUTION INTRAMUSCULAR; INTRAVENOUS ONCE AS NEEDED
Status: DISCONTINUED | OUTPATIENT
Start: 2025-06-13 | End: 2025-06-14 | Stop reason: HOSPADM

## 2025-06-13 RX ORDER — MISOPROSTOL 200 UG/1
800 TABLET ORAL ONCE AS NEEDED
Status: DISCONTINUED | OUTPATIENT
Start: 2025-06-13 | End: 2025-06-14 | Stop reason: SDUPTHER

## 2025-06-13 RX ORDER — LEVOTHYROXINE SODIUM 100 UG/1
100 TABLET ORAL DAILY
Status: COMPLETED | OUTPATIENT
Start: 2025-06-14 | End: 2025-06-14

## 2025-06-13 RX ORDER — TRANEXAMIC ACID 1 G/10ML
1000 INJECTION, SOLUTION INTRAVENOUS ONCE AS NEEDED
Status: DISCONTINUED | OUTPATIENT
Start: 2025-06-13 | End: 2025-06-14 | Stop reason: SDUPTHER

## 2025-06-13 RX ORDER — ONDANSETRON HYDROCHLORIDE 2 MG/ML
4 INJECTION, SOLUTION INTRAVENOUS EVERY 6 HOURS PRN
Status: DISCONTINUED | OUTPATIENT
Start: 2025-06-13 | End: 2025-06-14 | Stop reason: SDUPTHER

## 2025-06-13 RX ORDER — CALCIUM CARBONATE 200(500)MG
1 TABLET,CHEWABLE ORAL EVERY 6 HOURS PRN
Status: DISCONTINUED | OUTPATIENT
Start: 2025-06-13 | End: 2025-06-16 | Stop reason: HOSPADM

## 2025-06-13 RX ORDER — METHYLERGONOVINE MALEATE 0.2 MG/ML
0.2 INJECTION INTRAVENOUS ONCE AS NEEDED
Status: DISCONTINUED | OUTPATIENT
Start: 2025-06-13 | End: 2025-06-14 | Stop reason: HOSPADM

## 2025-06-13 RX ORDER — LIDOCAINE HYDROCHLORIDE 10 MG/ML
20 INJECTION, SOLUTION INFILTRATION; PERINEURAL ONCE AS NEEDED
Status: DISCONTINUED | OUTPATIENT
Start: 2025-06-13 | End: 2025-06-14 | Stop reason: HOSPADM

## 2025-06-13 RX ORDER — ONDANSETRON 4 MG/1
4 TABLET, FILM COATED ORAL EVERY 6 HOURS PRN
Status: DISCONTINUED | OUTPATIENT
Start: 2025-06-13 | End: 2025-06-14 | Stop reason: SDUPTHER

## 2025-06-13 RX ORDER — CARBOPROST TROMETHAMINE 250 UG/ML
250 INJECTION, SOLUTION INTRAMUSCULAR ONCE AS NEEDED
Status: DISCONTINUED | OUTPATIENT
Start: 2025-06-13 | End: 2025-06-14 | Stop reason: SDUPTHER

## 2025-06-13 RX ORDER — HYDRALAZINE HYDROCHLORIDE 20 MG/ML
5 INJECTION INTRAMUSCULAR; INTRAVENOUS ONCE AS NEEDED
Status: DISCONTINUED | OUTPATIENT
Start: 2025-06-13 | End: 2025-06-14 | Stop reason: SDUPTHER

## 2025-06-13 RX ORDER — TERBUTALINE SULFATE 1 MG/ML
0.25 INJECTION SUBCUTANEOUS ONCE AS NEEDED
Status: DISCONTINUED | OUTPATIENT
Start: 2025-06-13 | End: 2025-06-14 | Stop reason: HOSPADM

## 2025-06-13 RX ORDER — LOPERAMIDE HYDROCHLORIDE 2 MG/1
4 CAPSULE ORAL EVERY 2 HOUR PRN
Status: DISCONTINUED | OUTPATIENT
Start: 2025-06-13 | End: 2025-06-14 | Stop reason: SDUPTHER

## 2025-06-13 RX ADMIN — MISOPROSTOL 25 MCG: 100 TABLET ORAL at 21:15

## 2025-06-13 SDOH — SOCIAL STABILITY: SOCIAL INSECURITY: DO YOU FEEL ANYONE HAS EXPLOITED OR TAKEN ADVANTAGE OF YOU FINANCIALLY OR OF YOUR PERSONAL PROPERTY?: NO

## 2025-06-13 SDOH — SOCIAL STABILITY: SOCIAL INSECURITY
WITHIN THE LAST YEAR, HAVE YOU BEEN RAPED OR FORCED TO HAVE ANY KIND OF SEXUAL ACTIVITY BY YOUR PARTNER OR EX-PARTNER?: NO

## 2025-06-13 SDOH — HEALTH STABILITY: MENTAL HEALTH: NON-SPECIFIC ACTIVE SUICIDAL THOUGHTS (PAST 1 MONTH): NO

## 2025-06-13 SDOH — SOCIAL STABILITY: SOCIAL INSECURITY: WITHIN THE LAST YEAR, HAVE YOU BEEN AFRAID OF YOUR PARTNER OR EX-PARTNER?: NO

## 2025-06-13 SDOH — SOCIAL STABILITY: SOCIAL INSECURITY: ABUSE SCREEN: ADULT

## 2025-06-13 SDOH — SOCIAL STABILITY: SOCIAL INSECURITY: WITHIN THE LAST YEAR, HAVE YOU BEEN HUMILIATED OR EMOTIONALLY ABUSED IN OTHER WAYS BY YOUR PARTNER OR EX-PARTNER?: NO

## 2025-06-13 SDOH — ECONOMIC STABILITY: FOOD INSECURITY: WITHIN THE PAST 12 MONTHS, THE FOOD YOU BOUGHT JUST DIDN'T LAST AND YOU DIDN'T HAVE MONEY TO GET MORE.: NEVER TRUE

## 2025-06-13 SDOH — SOCIAL STABILITY: SOCIAL INSECURITY: HAVE YOU HAD ANY THOUGHTS OF HARMING ANYONE ELSE?: NO

## 2025-06-13 SDOH — HEALTH STABILITY: MENTAL HEALTH: WISH TO BE DEAD (PAST 1 MONTH): NO

## 2025-06-13 SDOH — SOCIAL STABILITY: SOCIAL INSECURITY
WITHIN THE LAST YEAR, HAVE YOU BEEN KICKED, HIT, SLAPPED, OR OTHERWISE PHYSICALLY HURT BY YOUR PARTNER OR EX-PARTNER?: NO

## 2025-06-13 SDOH — ECONOMIC STABILITY: FOOD INSECURITY: WITHIN THE PAST 12 MONTHS, YOU WORRIED THAT YOUR FOOD WOULD RUN OUT BEFORE YOU GOT THE MONEY TO BUY MORE.: NEVER TRUE

## 2025-06-13 SDOH — SOCIAL STABILITY: SOCIAL INSECURITY: DOES ANYONE TRY TO KEEP YOU FROM HAVING/CONTACTING OTHER FRIENDS OR DOING THINGS OUTSIDE YOUR HOME?: NO

## 2025-06-13 SDOH — HEALTH STABILITY: MENTAL HEALTH: SUICIDAL BEHAVIOR (LIFETIME): NO

## 2025-06-13 SDOH — SOCIAL STABILITY: SOCIAL INSECURITY: PHYSICAL ABUSE: DENIES

## 2025-06-13 SDOH — ECONOMIC STABILITY: FOOD INSECURITY: HOW HARD IS IT FOR YOU TO PAY FOR THE VERY BASICS LIKE FOOD, HOUSING, MEDICAL CARE, AND HEATING?: NOT HARD AT ALL

## 2025-06-13 SDOH — ECONOMIC STABILITY: TRANSPORTATION INSECURITY: IN THE PAST 12 MONTHS, HAS LACK OF TRANSPORTATION KEPT YOU FROM MEDICAL APPOINTMENTS OR FROM GETTING MEDICATIONS?: NO

## 2025-06-13 SDOH — HEALTH STABILITY: MENTAL HEALTH: WERE YOU ABLE TO COMPLETE ALL THE BEHAVIORAL HEALTH SCREENINGS?: YES

## 2025-06-13 SDOH — ECONOMIC STABILITY: HOUSING INSECURITY: DO YOU FEEL UNSAFE GOING BACK TO THE PLACE WHERE YOU ARE LIVING?: NO

## 2025-06-13 SDOH — SOCIAL STABILITY: SOCIAL INSECURITY: ARE THERE ANY APPARENT SIGNS OF INJURIES/BEHAVIORS THAT COULD BE RELATED TO ABUSE/NEGLECT?: NO

## 2025-06-13 SDOH — SOCIAL STABILITY: SOCIAL INSECURITY: HAS ANYONE EVER THREATENED TO HURT YOUR FAMILY OR YOUR PETS?: NO

## 2025-06-13 SDOH — SOCIAL STABILITY: SOCIAL INSECURITY: ARE YOU OR HAVE YOU BEEN THREATENED OR ABUSED PHYSICALLY, EMOTIONALLY, OR SEXUALLY BY ANYONE?: NO

## 2025-06-13 SDOH — SOCIAL STABILITY: SOCIAL INSECURITY: VERBAL ABUSE: DENIES

## 2025-06-13 SDOH — HEALTH STABILITY: MENTAL HEALTH: CURRENT SMOKER: 0

## 2025-06-13 SDOH — SOCIAL STABILITY: SOCIAL INSECURITY: HAVE YOU HAD THOUGHTS OF HARMING ANYONE ELSE?: NO

## 2025-06-13 ASSESSMENT — PAIN SCALES - GENERAL
PAINLEVEL_OUTOF10: 0 - NO PAIN

## 2025-06-13 ASSESSMENT — PATIENT HEALTH QUESTIONNAIRE - PHQ9
SUM OF ALL RESPONSES TO PHQ9 QUESTIONS 1 & 2: 0
2. FEELING DOWN, DEPRESSED OR HOPELESS: NOT AT ALL
1. LITTLE INTEREST OR PLEASURE IN DOING THINGS: NOT AT ALL

## 2025-06-13 ASSESSMENT — LIFESTYLE VARIABLES
HOW MANY STANDARD DRINKS CONTAINING ALCOHOL DO YOU HAVE ON A TYPICAL DAY: PATIENT DOES NOT DRINK
HOW OFTEN DO YOU HAVE 6 OR MORE DRINKS ON ONE OCCASION: NEVER
HOW OFTEN DO YOU HAVE A DRINK CONTAINING ALCOHOL: NEVER
SKIP TO QUESTIONS 9-10: 1
AUDIT-C TOTAL SCORE: 0
AUDIT-C TOTAL SCORE: 0

## 2025-06-13 ASSESSMENT — ACTIVITIES OF DAILY LIVING (ADL)
LACK_OF_TRANSPORTATION: NO
LACK_OF_TRANSPORTATION: NO

## 2025-06-14 ENCOUNTER — ANESTHESIA (OUTPATIENT)
Dept: OBSTETRICS AND GYNECOLOGY | Facility: HOSPITAL | Age: 35
End: 2025-06-14
Payer: COMMERCIAL

## 2025-06-14 ENCOUNTER — ANESTHESIA EVENT (OUTPATIENT)
Dept: OBSTETRICS AND GYNECOLOGY | Facility: HOSPITAL | Age: 35
End: 2025-06-14
Payer: COMMERCIAL

## 2025-06-14 LAB — TREPONEMA PALLIDUM IGG+IGM AB [PRESENCE] IN SERUM OR PLASMA BY IMMUNOASSAY: NONREACTIVE

## 2025-06-14 PROCEDURE — 0U7C7DJ DILATION OF CERVIX WITH INTRALUM DEV, TEMP, VIA OPENING: ICD-10-PCS | Performed by: OBSTETRICS & GYNECOLOGY

## 2025-06-14 PROCEDURE — 10907ZC DRAINAGE OF AMNIOTIC FLUID, THERAPEUTIC FROM PRODUCTS OF CONCEPTION, VIA NATURAL OR ARTIFICIAL OPENING: ICD-10-PCS | Performed by: OBSTETRICS & GYNECOLOGY

## 2025-06-14 PROCEDURE — 1220000001 HC OB SEMI-PRIVATE ROOM DAILY

## 2025-06-14 PROCEDURE — 2500000004 HC RX 250 GENERAL PHARMACY W/ HCPCS (ALT 636 FOR OP/ED): Performed by: OBSTETRICS & GYNECOLOGY

## 2025-06-14 PROCEDURE — 2720000007 HC OR 272 NO HCPCS

## 2025-06-14 PROCEDURE — 3E0P7GC INTRODUCTION OF OTHER THERAPEUTIC SUBSTANCE INTO FEMALE REPRODUCTIVE, VIA NATURAL OR ARTIFICIAL OPENING: ICD-10-PCS | Performed by: OBSTETRICS & GYNECOLOGY

## 2025-06-14 PROCEDURE — 2500000004 HC RX 250 GENERAL PHARMACY W/ HCPCS (ALT 636 FOR OP/ED)

## 2025-06-14 PROCEDURE — 2500000004 HC RX 250 GENERAL PHARMACY W/ HCPCS (ALT 636 FOR OP/ED): Performed by: ADVANCED PRACTICE MIDWIFE

## 2025-06-14 PROCEDURE — 0DQR0ZZ REPAIR ANAL SPHINCTER, OPEN APPROACH: ICD-10-PCS | Performed by: ADVANCED PRACTICE MIDWIFE

## 2025-06-14 PROCEDURE — 2500000001 HC RX 250 WO HCPCS SELF ADMINISTERED DRUGS (ALT 637 FOR MEDICARE OP): Performed by: ADVANCED PRACTICE MIDWIFE

## 2025-06-14 PROCEDURE — C1725 CATH, TRANSLUMIN NON-LASER: HCPCS

## 2025-06-14 PROCEDURE — 7100000016 HC LABOR RECOVERY PER HOUR

## 2025-06-14 PROCEDURE — 3E033VJ INTRODUCTION OF OTHER HORMONE INTO PERIPHERAL VEIN, PERCUTANEOUS APPROACH: ICD-10-PCS | Performed by: OBSTETRICS & GYNECOLOGY

## 2025-06-14 PROCEDURE — 2500000002 HC RX 250 W HCPCS SELF ADMINISTERED DRUGS (ALT 637 FOR MEDICARE OP, ALT 636 FOR OP/ED): Performed by: OBSTETRICS & GYNECOLOGY

## 2025-06-14 PROCEDURE — 7210000002 HC LABOR PER HOUR

## 2025-06-14 PROCEDURE — 51701 INSERT BLADDER CATHETER: CPT

## 2025-06-14 PROCEDURE — 59409 OBSTETRICAL CARE: CPT | Performed by: ADVANCED PRACTICE MIDWIFE

## 2025-06-14 RX ORDER — METHYLERGONOVINE MALEATE 0.2 MG/ML
0.2 INJECTION INTRAVENOUS ONCE AS NEEDED
Status: DISCONTINUED | OUTPATIENT
Start: 2025-06-14 | End: 2025-06-16 | Stop reason: HOSPADM

## 2025-06-14 RX ORDER — LOPERAMIDE HYDROCHLORIDE 2 MG/1
4 CAPSULE ORAL EVERY 2 HOUR PRN
Status: DISCONTINUED | OUTPATIENT
Start: 2025-06-14 | End: 2025-06-16 | Stop reason: HOSPADM

## 2025-06-14 RX ORDER — ONDANSETRON HYDROCHLORIDE 2 MG/ML
4 INJECTION, SOLUTION INTRAVENOUS EVERY 6 HOURS PRN
Status: DISCONTINUED | OUTPATIENT
Start: 2025-06-14 | End: 2025-06-16 | Stop reason: HOSPADM

## 2025-06-14 RX ORDER — DIPHENHYDRAMINE HCL 25 MG
25 TABLET ORAL EVERY 6 HOURS PRN
Status: DISCONTINUED | OUTPATIENT
Start: 2025-06-14 | End: 2025-06-16 | Stop reason: HOSPADM

## 2025-06-14 RX ORDER — LABETALOL HYDROCHLORIDE 5 MG/ML
20 INJECTION, SOLUTION INTRAVENOUS ONCE AS NEEDED
Status: DISCONTINUED | OUTPATIENT
Start: 2025-06-14 | End: 2025-06-16 | Stop reason: HOSPADM

## 2025-06-14 RX ORDER — FENTANYL CITRATE 50 UG/ML
INJECTION, SOLUTION INTRAMUSCULAR; INTRAVENOUS AS NEEDED
Status: DISCONTINUED | OUTPATIENT
Start: 2025-06-14 | End: 2025-06-14

## 2025-06-14 RX ORDER — HYDRALAZINE HYDROCHLORIDE 20 MG/ML
5 INJECTION INTRAMUSCULAR; INTRAVENOUS ONCE AS NEEDED
Status: DISCONTINUED | OUTPATIENT
Start: 2025-06-14 | End: 2025-06-16 | Stop reason: HOSPADM

## 2025-06-14 RX ORDER — BISACODYL 10 MG/1
10 SUPPOSITORY RECTAL DAILY PRN
Status: DISCONTINUED | OUTPATIENT
Start: 2025-06-14 | End: 2025-06-16 | Stop reason: HOSPADM

## 2025-06-14 RX ORDER — CARBOPROST TROMETHAMINE 250 UG/ML
250 INJECTION, SOLUTION INTRAMUSCULAR ONCE AS NEEDED
Status: DISCONTINUED | OUTPATIENT
Start: 2025-06-14 | End: 2025-06-16 | Stop reason: HOSPADM

## 2025-06-14 RX ORDER — NALBUPHINE HYDROCHLORIDE 10 MG/ML
10 INJECTION INTRAMUSCULAR; INTRAVENOUS; SUBCUTANEOUS ONCE
Status: COMPLETED | OUTPATIENT
Start: 2025-06-14 | End: 2025-06-14

## 2025-06-14 RX ORDER — ADHESIVE BANDAGE
10 BANDAGE TOPICAL
Status: DISCONTINUED | OUTPATIENT
Start: 2025-06-14 | End: 2025-06-16 | Stop reason: HOSPADM

## 2025-06-14 RX ORDER — ONDANSETRON 4 MG/1
4 TABLET, FILM COATED ORAL EVERY 6 HOURS PRN
Status: DISCONTINUED | OUTPATIENT
Start: 2025-06-14 | End: 2025-06-16 | Stop reason: HOSPADM

## 2025-06-14 RX ORDER — DIPHENHYDRAMINE HYDROCHLORIDE 50 MG/ML
25 INJECTION, SOLUTION INTRAMUSCULAR; INTRAVENOUS EVERY 6 HOURS PRN
Status: DISCONTINUED | OUTPATIENT
Start: 2025-06-14 | End: 2025-06-16 | Stop reason: HOSPADM

## 2025-06-14 RX ORDER — BUPIVACAINE HYDROCHLORIDE 7.5 MG/ML
INJECTION, SOLUTION EPIDURAL; RETROBULBAR AS NEEDED
Status: DISCONTINUED | OUTPATIENT
Start: 2025-06-14 | End: 2025-06-14

## 2025-06-14 RX ORDER — NALBUPHINE HYDROCHLORIDE 10 MG/ML
INJECTION INTRAMUSCULAR; INTRAVENOUS; SUBCUTANEOUS
Status: COMPLETED
Start: 2025-06-14 | End: 2025-06-14

## 2025-06-14 RX ORDER — IBUPROFEN 600 MG/1
600 TABLET, FILM COATED ORAL EVERY 6 HOURS SCHEDULED
Status: DISCONTINUED | OUTPATIENT
Start: 2025-06-14 | End: 2025-06-16 | Stop reason: HOSPADM

## 2025-06-14 RX ORDER — TRANEXAMIC ACID 1 G/10ML
1000 INJECTION, SOLUTION INTRAVENOUS ONCE AS NEEDED
Status: ACTIVE | OUTPATIENT
Start: 2025-06-14 | End: 2025-06-15

## 2025-06-14 RX ORDER — NIFEDIPINE 10 MG/1
10 CAPSULE ORAL ONCE AS NEEDED
Status: DISCONTINUED | OUTPATIENT
Start: 2025-06-14 | End: 2025-06-16 | Stop reason: HOSPADM

## 2025-06-14 RX ORDER — POLYETHYLENE GLYCOL 3350 17 G/17G
17 POWDER, FOR SOLUTION ORAL 2 TIMES DAILY PRN
Status: DISCONTINUED | OUTPATIENT
Start: 2025-06-14 | End: 2025-06-15

## 2025-06-14 RX ORDER — OXYTOCIN/0.9 % SODIUM CHLORIDE 30/500 ML
2-30 PLASTIC BAG, INJECTION (ML) INTRAVENOUS CONTINUOUS
Status: DISCONTINUED | OUTPATIENT
Start: 2025-06-14 | End: 2025-06-16 | Stop reason: HOSPADM

## 2025-06-14 RX ORDER — CEFAZOLIN SODIUM 2 G/100ML
2 INJECTION, SOLUTION INTRAVENOUS ONCE
Status: COMPLETED | OUTPATIENT
Start: 2025-06-14 | End: 2025-06-14

## 2025-06-14 RX ORDER — LEVOTHYROXINE SODIUM 100 UG/1
100 TABLET ORAL DAILY
Status: DISCONTINUED | OUTPATIENT
Start: 2025-06-15 | End: 2025-06-15

## 2025-06-14 RX ORDER — MINERAL OIL
OIL (ML) ORAL
Status: DISPENSED
Start: 2025-06-14 | End: 2025-06-14

## 2025-06-14 RX ORDER — ACETAMINOPHEN 325 MG/1
975 TABLET ORAL EVERY 6 HOURS SCHEDULED
Status: DISCONTINUED | OUTPATIENT
Start: 2025-06-14 | End: 2025-06-16 | Stop reason: HOSPADM

## 2025-06-14 RX ORDER — OXYTOCIN 10 [USP'U]/ML
10 INJECTION, SOLUTION INTRAMUSCULAR; INTRAVENOUS ONCE AS NEEDED
Status: DISCONTINUED | OUTPATIENT
Start: 2025-06-14 | End: 2025-06-16 | Stop reason: HOSPADM

## 2025-06-14 RX ORDER — METRONIDAZOLE 500 MG/100ML
500 INJECTION, SOLUTION INTRAVENOUS ONCE
Status: COMPLETED | OUTPATIENT
Start: 2025-06-14 | End: 2025-06-14

## 2025-06-14 RX ORDER — CEFAZOLIN SODIUM 1 G/50ML
SOLUTION INTRAVENOUS
Status: DISPENSED
Start: 2025-06-14 | End: 2025-06-15

## 2025-06-14 RX ORDER — SIMETHICONE 80 MG
80 TABLET,CHEWABLE ORAL 4 TIMES DAILY PRN
Status: DISCONTINUED | OUTPATIENT
Start: 2025-06-14 | End: 2025-06-16 | Stop reason: HOSPADM

## 2025-06-14 RX ORDER — MISOPROSTOL 200 UG/1
800 TABLET ORAL ONCE AS NEEDED
Status: DISCONTINUED | OUTPATIENT
Start: 2025-06-14 | End: 2025-06-16 | Stop reason: HOSPADM

## 2025-06-14 RX ADMIN — ONDANSETRON 4 MG: 2 INJECTION INTRAMUSCULAR; INTRAVENOUS at 03:37

## 2025-06-14 RX ADMIN — ACETAMINOPHEN 975 MG: 325 TABLET ORAL at 16:21

## 2025-06-14 RX ADMIN — POLYETHYLENE GLYCOL 3350 17 G: 17 POWDER, FOR SOLUTION ORAL at 22:36

## 2025-06-14 RX ADMIN — METRONIDAZOLE 500 MG: 500 INJECTION, SOLUTION INTRAVENOUS at 19:02

## 2025-06-14 RX ADMIN — CEFAZOLIN SODIUM 2 G: 2 INJECTION, SOLUTION INTRAVENOUS at 13:15

## 2025-06-14 RX ADMIN — IBUPROFEN 600 MG: 600 TABLET ORAL at 22:36

## 2025-06-14 RX ADMIN — SODIUM CHLORIDE, SODIUM LACTATE, POTASSIUM CHLORIDE, AND CALCIUM CHLORIDE 75 ML/HR: .6; .31; .03; .02 INJECTION, SOLUTION INTRAVENOUS at 06:00

## 2025-06-14 RX ADMIN — FENTANYL CITRATE 15 MCG: 50 INJECTION, SOLUTION INTRAMUSCULAR; INTRAVENOUS at 12:48

## 2025-06-14 RX ADMIN — NALBUPHINE HYDROCHLORIDE 10 MG: 10 INJECTION INTRAMUSCULAR; INTRAVENOUS; SUBCUTANEOUS at 07:49

## 2025-06-14 RX ADMIN — LEVOTHYROXINE SODIUM 100 MCG: 0.1 TABLET ORAL at 06:05

## 2025-06-14 RX ADMIN — MISOPROSTOL 25 MCG: 100 TABLET ORAL at 00:23

## 2025-06-14 RX ADMIN — BUPIVACAINE HYDROCHLORIDE 1.2 ML: 7.5 INJECTION, SOLUTION EPIDURAL; RETROBULBAR at 12:48

## 2025-06-14 RX ADMIN — Medication 2 MILLI-UNITS/MIN: at 06:00

## 2025-06-14 RX ADMIN — IBUPROFEN 600 MG: 600 TABLET ORAL at 16:21

## 2025-06-14 RX ADMIN — ACETAMINOPHEN 975 MG: 325 TABLET ORAL at 22:36

## 2025-06-14 SDOH — HEALTH STABILITY: MENTAL HEALTH: CURRENT SMOKER: 0

## 2025-06-14 ASSESSMENT — PAIN SCALES - GENERAL
PAINLEVEL_OUTOF10: 4
PAIN_LEVEL: 0
PAINLEVEL_OUTOF10: 0 - NO PAIN
PAINLEVEL_OUTOF10: 3
PAINLEVEL_OUTOF10: 0 - NO PAIN
PAINLEVEL_OUTOF10: 0 - NO PAIN
PAINLEVEL_OUTOF10: 3
PAINLEVEL_OUTOF10: 2
PAINLEVEL_OUTOF10: 0 - NO PAIN
PAINLEVEL_OUTOF10: 5 - MODERATE PAIN
PAINLEVEL_OUTOF10: 2

## 2025-06-14 NOTE — PROGRESS NOTES
Intrapartum Progress Note    Assessment/Plan   Sugey Dasilva is a 34 y.o.  at 38w1d. GRIFFIN: 2025, by Ultrasound.     IOL for FGR  -Category I FHT  -Zimmer balloon out  -discussed AROM within 1 hour of Zimmer coming out to expedite time to delivery. Pt unsure about AROM at this time and prefers to hold off. Will defer cervical exam as well until pt requests/is ready for AROM  -pitocin ordered, may start at 0430 if contractions space out  -pt desires natural childbirth, has been in communication with her doulas and will bounce on ball now    Subjective   Zimmer balloon fell out. Mild nausea, treated with Zofran.     Objective   Last Vitals:  Temp Pulse Resp BP MAP Pulse Ox   36.7 °C (98 °F) 90 16 114/71 88 98 %     Vitals Min/Max Last 24 Hours:  Temp  Min: 36.6 °C (97.9 °F)  Max: 36.7 °C (98 °F)  Pulse  Min: 90  Max: 111  Resp  Min: 16  Max: 17  BP  Min: 114/71  Max: 141/94  MAP (mmHg)  Min: 88  Max: 114    Intake/Output:  No intake or output data in the 24 hours ending 25 0356    Physical Examination:  GENERAL: Examination reveals a well developed, well nourished, gravid female in no acute distress. She is alert and cooperative.  SKIN: normal coloration and turgor, no rashes  Fetal heart rate: baseline 150, moderate variability, positive accelerations, negative decelerations  [place]: q 2-4 min      Lab Review:  Labs in chart were reviewed.

## 2025-06-14 NOTE — PROGRESS NOTES
Pt amenable to AROM. Discussed risk of cord prolapse and obtained verbal consent. Cervical exam 4/70/-2, soft, mid. AROM performed for light meconium stained amniotic fluid. Pt tolerated well.     Mónica Law MD  06/14/25  4:23 AM

## 2025-06-14 NOTE — PROGRESS NOTES
Intrapartum Progress Note    Assessment/Plan   Sugey Dasilva is a 34 y.o.  at 38w1d. GRIFFIN: 2025, by Ultrasound.       Subjective   Feeling some cramping    Objective   Last Vitals:  Temp Pulse Resp BP MAP Pulse Ox   36.7 °C (98 °F) 90 16 114/71 88 98 %     Vitals Min/Max Last 24 Hours:  Temp  Min: 36.6 °C (97.9 °F)  Max: 36.7 °C (98 °F)  Pulse  Min: 90  Max: 111  Resp  Min: 16  Max: 17  BP  Min: 114/71  Max: 141/94  MAP (mmHg)  Min: 88  Max: 114    Intake/Output:  No intake or output data in the 24 hours ending 25 0100    Physical Examination:  GENERAL: Examination reveals a well developed, well nourished, gravid female in no acute distress. She is alert and cooperative.  CERVIX: 2/60/-2, soft, midpositiion; MEMBRANES are Intact    Fetal heart rate: baseline 130, moderate variability, accelerations present, decelerations absent  [place]: approximately every 4 minutes    Chaperone Present: Yes.  Chaperone Name/Title: Grace Lamar RN    Lab Review:  Lab Results   Component Value Date    WBC 9.6 2025    HGB 13.0 2025    HCT 36.1 2025     2025     Lab Results   Component Value Date    GLUCOSE 101 (H) 2025    CALCIUM 8.7 2025     2025    K 3.6 2025    CO2 21 2025     2025    BUN 12 2025    CREATININE 0.64 2025     IOL for FGR  -Category I FHT  -Zimmer balloon placed without difficulty and inflated with 30mL sterile saline. Pt tolerated well. Cytotec 25 mcg #2 placed at 0023. Will plan for pitocin 4 hours after last cytotec.   -Mild range BP x 1. Repeat WNL. Continue vitals per protocol.     Mónica Law MD  25  1:00 AM

## 2025-06-14 NOTE — PROGRESS NOTES
Intrapartum Progress Note    Assessment/Plan   Sugey Dasilva is a 34 y.o.  at 38w1d. GRIFFIN: 2025, by Ultrasound.     IOL for FGR  -getting more uncomfortable with contractions  -FHR remains Category I  -continue pitocin  - and  present for support, desires natural childbirth    Subjective   Getting more uncomfortable    Objective   Last Vitals:  Temp Pulse Resp BP MAP Pulse Ox   36.7 °C (98.1 °F) 85 16 113/70 87 99 %     Vitals Min/Max Last 24 Hours:  Temp  Min: 36.6 °C (97.9 °F)  Max: 36.7 °C (98.1 °F)  Pulse  Min: 85  Max: 111  Resp  Min: 16  Max: 17  BP  Min: 113/70  Max: 141/94  MAP (mmHg)  Min: 87  Max: 114    Intake/Output:  No intake or output data in the 24 hours ending 25 0728    Physical Examination:  CERVIX: 5 cm dilated, 70 % effaced, -2 station; MEMBRANES are SROM    Chaperone Present: Yes.  Chaperone Name/Title: primary RN    Lab Review:  Labs in chart were reviewed.

## 2025-06-14 NOTE — PROGRESS NOTES
Bedside Huddle with patient, FOB, nurse, , and myself. Reviewed FHR tracing and factors such as thick meconium as well Pitocin protocols, if variables continue may need to consider IUPC and an amnioinfusion. Patient and FOB appear to have a good understanding and will consider if needed.

## 2025-06-14 NOTE — L&D DELIVERY NOTE
Vaginal Delivery Note    Patient Name: Sugey Dasilva  : 1990  MRN: [id number]  Age: 34 y.o.    /Para:   Gestational Age: 38w1d    Date of Delivery: 2025    Procedure: Normal Spontaneous Vaginal Delivery    Delivery Provider: Marie Gustafson CNM    Resident/Fellow/Other Assistant: none    Description of Procedure:  Delivery of viable infant under no anesthesia. Delayed clamping was performed. The infant was placed skin to skin. Cord gases were not sent.  Cord blood was collected. Placenta delivered intact and fundus was firm    Probable 3rd degree Laceration identified and repaired. (Dr Ibarra consulted at bedside for evaluation and repair of likely 3rd degree    Additional Procedures:  None    Findings:   Amniotic fluid Meconium, Male infant in Vertex Occiput Posterior presentation, APGARS  ,  .  Birth Weight  .    Complications: None    Quantitative Blood Loss:   Delivery QBL: 0 mL (2025 12:17 PM - 2025 12:45 PM)  QBL after repair 350cc    Blood products:      Uterotonics/Hemostatic Agent: IV Pitocin 30 units    Specimen:   Placenta  Delivered:    Appearance: Intact  Removal: Spontaneous    Disposition: family    Sponge/Instrument/Needle Counts: The sponge, lap and needle counts were correct.    Patient Disposition: Patient recovering on labor and delivery in stable condition.    Tavares Dasilva [[id number]]      Labor Events    Sac identifier: Sac 1  Rupture date/time: 2025 0415  Rupture type: Spontaneous  Fluid color: Meconium  Fluid odor: None  Labor type: Induced Onset of Labor  Labor allowed to proceed with plans for an attempted vaginal birth?: Yes  Induction: Zimmer/EASI, Oxytocin, AROM  Induction indications: Fetal Abnormality  Complications: None       Labor Event Times    Labor onset date/time: 2025 0730  Dilation complete date/time: 2025 1030       Placenta    Placenta delivery date/time:   Placenta removal: Spontaneous  Placenta appearance:  Intact  Placenta disposition: family       Cord    Vessels: 3 vessels  Complications: None  Delayed cord clamping?: Yes  Cord blood disposition: Lab  Gases sent?: No  Stem cell collection (by provider): No       Anesthesia    Method: Spinal       Operative Delivery    Forceps attempted?: No  Vacuum extractor attempted?: No       Shoulder Dystocia    Shoulder dystocia present?: No        Delivery    Birth date/time: 2025 12:17:00  Delivery type: Vaginal, Spontaneous  Complications: None       Apgars    Living status:   Apgar Component Scores:  1 min.:  5 min.:  10 min.:  15 min.:  20 min.:    Skin color:         Heart rate:         Reflex irritability:         Muscle tone:         Respiratory effort:         Total:                Delivery Providers    Delivering clinician:    Provider Role     Delivery Nurse     Nursery Nurse     Resident

## 2025-06-14 NOTE — H&P
OB Admission H&P    Assessment/Plan    Sugey Dasilva is a 34 y.o.  at 38w0d, GRIFFIN: 2025, by Ultrasound, who presents for Induction of Labor.    Plan  -Admit to L&D, consented  -T&S, CBC, and Syphilis  -Epidural at patient request  -Recheck as clinically indicated by maternal or fetal status  -Plan to initiate induction with cytotec 25 mcg vaginally q 4 hrs.   -BMP ordered to check Cr    Fetal Status  -NST reactive, reassuring   -Presentation cephalic based on ultrasound  -EFW 2162g by US on 25  -GBS neg    Postpartum  Contraception Plan: none per [facility] policy    Pregnancy Problems (from 24 to present)       Problem Noted Diagnosed Resolved    37 weeks gestation of pregnancy ([facility]) 2025 by TREV Tejada-MOUNIKA  No    Priority:  Medium       Overview Addendum 6/3/2025 10:05 AM by Mayela Morrison MD   Desired provider in labor: [] CNM  [x] Physician  [x] Blood Products: [x] Yes, accepts [] No, needs counseling  [x] Initial BMI: 23.77   [x] Prenatal Labs: wnl  [x] Cervical Cancer Screening up to date  [x] Rh status:  B+  [x] Genetic Screening:  declines  [x] NT US: (11-13 wks) normal  [x] Baby ASA (if indicated): yes, taking  [x] Pregnancy dated by: IVF dates    [x] Anatomy US: (19-20 wks) wnl at 20 wks, FGR at 30 wks (efw 3%, AC 5%)  [] Federal Sterilization consent signed (if indicated):  [x] 1hr GCT at 24-28wks: wnl  [] Rhogam (if indicated):  not indicated  [] Fetal Surveillance (if indicated): NSTs at 36 wks for IVF, now getting weekly BPPs for FGR  [x] Tdap (27-32 wks, may be given up to 36 wks if initial window missed):  given   [] RSV (32-36 wks) (Sept. to end of ):   [x] Flu Vaccine: counseled, declines  Recommended updated covid booster, declines    [] Breastfeeding:  [] Postpartum Birth control method:   [] GBS at 36 - 37 wks: pending 6/3  [x] 39 weeks discussion of IOL vs. Expectant management: IOL at 38.0 for FGR, scheduled  at 8 pm  [] Mode of delivery (  anticipated ):           Fetal growth restriction antepartum ([facility]) 2025 by Mayela Morrison MD  No    Priority:  Medium       Overview Addendum 6/3/2025 10:04 AM by Mayela Morrison MD   -dx at 30 wks, EFW 3%, AC 5%, weekly BPP/UAD/TIEN, del 38-39 wks, if EFW drops below 3% on next scan then needs twice weekly testing and delivery in 37th week  -8% at 33 wks  -5% at 36 wks, IOL at 38.0 scheduled         34 weeks gestation of pregnancy ([facility]) 2024 by Mayela Morrison MD  2025 by TREV Tejada-CNM    Overview Addendum 2025  9:33 AM by Mayela Morrison MD   Desired provider in labor: [] CNM  [x] Physician  [x] Blood Products: [x] Yes, accepts [] No, needs counseling  [x] Initial BMI: 23.77   [x] Prenatal Labs: wnl  [x] Cervical Cancer Screening up to date  [x] Rh status:  B+  [x] Genetic Screening:  declines  [x] NT US: (11-13 wks) normal  [x] Baby ASA (if indicated): yes, taking  [x] Pregnancy dated by: IVF dates    [x] Anatomy US: (19-20 wks) wnl at 20 wks, FGR at 30 wks (efw 3%, AC 5%)  [] Federal Sterilization consent signed (if indicated):  [x] 1hr GCT at 24-28wks: wnl  [] Rhogam (if indicated):  not indicated  [] Fetal Surveillance (if indicated): NSTs at 36 wks for IVF, now getting weekly BPPs for FGR  [x] Tdap (27-32 wks, may be given up to 36 wks if initial window missed):  given   [] RSV (32-36 wks) (Sept. to end of ):   [x] Flu Vaccine: counseled, declines  Recommended updated covid booster, declines    [] Breastfeeding:  [] Postpartum Birth control method:   [] GBS at 36 - 37 wks:  [] 39 weeks discussion of IOL vs. Expectant management: del by 39.6 for IVF  [] Mode of delivery ( anticipated ):                   Subjective   Good fetal movement.  Denies vaginal bleeding. Denies contractions., Denies leaking of fluid.      Prenatal Provider Dr. Morrison    OB History    Para Term  AB Living   2 0 0 0 1 0   SAB IAB Ectopic Multiple Live Births   1 0 0 0 0      #  Outcome Date GA Lbr Dhruv/2nd Weight Sex Type Anes PTL Lv   2 Current            1 SAB 05/2024 4w0d    Biochemical          Surgical History[1]    Social History     Tobacco Use    Smoking status: Never    Smokeless tobacco: Never   Substance Use Topics    Alcohol use: Not Currently       Allergies[2]    Prescriptions Prior to Admission[3]  Objective     Last Vitals  Temp Pulse Resp BP MAP O2 Sat   36.6 °C (97.9 °F) 107 17 126/82 97 (!) 93 %     Blood Pressures         6/13/2025 2011             BP: 126/82             Physical Exam  General: NAD, mood appropriate  Cardiopulmonary: warm and well perfused, breathing comfortably on room air  Abdomen: Gravid, non-tender  Extremities: Symmetric  Speculum Exam: deferred  Cervix: 1 /50 /      Chaperone Present: Yes.  Chaperone Name/Title: Grace Lamar RN       Fetal Monitoring  Baseline: 130 bpm, Variability: moderate,  Accelerations: present and Decelerations: none  Uterine Activity: No contractions seen on toco  Interpretation: Reactive    Bedside ultrasound: Yes    Labs in chart were reviewed.  0   Lab Value Date/Time    GRPBSTREP SEE NOTE 06/02/2025 0000     CBC             Prenatal labs reviewed, not remarkable.  BMP ordered for kidney function on admission given Alport Syndrome    Mónica Law MD  06/13/25  9:29 PM               [1]   Past Surgical History:  Procedure Laterality Date    ANTERIOR CRUCIATE LIGAMENT REPAIR  2018    COLPOSCOPY  01/2024    TONGUE SURGERY      Tongue tie repaired at 3 years old    WISDOM TOOTH EXTRACTION  2010   [2] No Known Allergies  [3]   Medications Prior to Admission   Medication Sig Dispense Refill Last Dose/Taking    BABY ASPIRIN ORAL Take by mouth.   6/12/2025 Bedtime    levothyroxine (Synthroid, Levoxyl) 25 mcg tablet Take one (1) tablet by mouth in the morning on an empty stomach at least 30 -45 min prior to eating   More than a month    levothyroxine (Synthroid, Levoxyl) 50 mcg tablet Take 1 tablet (50 mcg) by mouth once  daily. 30 tablet 0 6/13/2025    magnesium oxide 300 mg magnesium tablet Take 0.5 tablets by mouth once daily.   More than a month    prenatal vit no.124/iron/folic (PRENATAL VITAMIN ORAL) Take by mouth.   6/12/2025    levothyroxine (Synthroid, Levoxyl) 50 mcg tablet Take 1 tablet (50 mcg) by mouth once daily. (Patient not taking: Reported on 5/7/2025) 30 tablet 0

## 2025-06-14 NOTE — ANESTHESIA PREPROCEDURE EVALUATION
"Patient: Sugey Dasilva    Evaluation Method: In-person visit    Procedure Information    Date: 25  Procedure: Labor Consult        38w0d 34 y.o. IOL for FGR and IVF.  Patient has hypothyroidism and Alport syndrome (normal kidney function; patient says she has \"micro heme\" in her urine).  Patient has chronic right sciatic pain.      /57   Pulse 96   Temp 36.7 °C (98.1 °F) (Oral)   Resp 16   Ht 1.524 m (5')   Wt 76.6 kg (168 lb 14 oz)   LMP 2023 (Exact Date)   SpO2 97%   BMI 32.98 kg/m²       Relevant Problems   Endocrine   (+) Hypothyroidism      HEENT   (+) Alport syndrome (HHS-HCC)      ID   (+) Vulvovaginal candidiasis      GYN   (+) 38 weeks gestation of pregnancy ([facility])       Clinical information reviewed:   Tobacco  Allergies  Meds  Problems  Med Hx  Surg Hx   Fam Hx          NPO Detail:  NPO/Void Status  Date of Last Liquid: 25  Time of Last Liquid: 1800  Date of Last Solid: 25  Time of Last Solid: 1800         OB/Gyn Evaluation    Present Pregnancy    Patient is pregnant now.   Obstetric History                Physical Exam    Airway  Mallampati: III  TM distance: >3 FB  Neck ROM: full  Mouth opening: 3 or more finger widths     Cardiovascular   Rhythm: regular  Rate: normal     Dental   Comments: Lower permanent retainer     Pulmonary Breath sounds clear to auscultation     Abdominal   Comments: gravid           Anesthesia Plan    History of general anesthesia?: yes  History of complications of general anesthesia?: no    ASA 2     spinal   (Patient has no history of problems with anesthesia  Patient has no family history of problems with anesthesia     I informed and discussed the risks and benefits of general, spinal and epidural anesthesia with the patient.  The patient expressed her understanding and her questions were answered.  A verbal consent was given by the patient.  )  The patient is not a current smoker.    Anesthetic plan and risks " discussed with patient.  Use of blood products discussed with patient who consented to blood products.

## 2025-06-14 NOTE — CARE PLAN
The patient's goals for the shift include Have a healthy baby    The clinical goals for the shift include Have a safe induction      Problem: Antepartum  Goal: FHR remains reassuring  Outcome: Progressing  Flowsheets (Taken 2025)  FHR remains reassuring: Fetal monitoring, continuous  Goal: Minimize anxiety/maximize coping  Outcome: Progressing  Flowsheets (Taken 2025)  Minimize anxiety/maximize coping: Clustered care, diversional tx as desired, consult(s) PRN     Problem: Vaginal Birth or  Section  Goal: Demonstrates labor coping techniques through delivery  Outcome: Progressing  Flowsheets (Taken 2025)  Demonstrates labor coping techniques through delivery:   Positioning, turning, and/or use of birthing ball assistance   Breathing/relaxation assistance     Problem: Pain - Adult  Goal: Verbalizes/displays adequate comfort level or baseline comfort level  Outcome: Progressing  Flowsheets (Taken 2025)  Verbalizes/displays adequate comfort level or baseline comfort level:   Encourage patient to monitor pain and request assistance   Assess pain using appropriate pain scale   Consider cultural and social influences on pain and pain management     Problem: Safety - Adult  Goal: Free from fall injury  Outcome: Progressing  Flowsheets (Taken 2025)  Free from fall injury: Instruct family/caregiver on patient safety     Problem: Discharge Planning  Goal: Discharge to home or other facility with appropriate resources  Outcome: Progressing  Flowsheets (Taken 2025)  Discharge to home or other facility with appropriate resources:   Identify barriers to discharge with patient and caregiver   Arrange for needed discharge resources and transportation as appropriate   Identify discharge learning needs (meds, wound care, etc)

## 2025-06-14 NOTE — ANESTHESIA PREPROCEDURE EVALUATION
"Patient: Sugey Dasilva    Evaluation Method: In-person visit    Procedure Information    Date: 25  Procedure: Labor Consult        38w0d 34 y.o. IOL for FGR and IVF.  Patient has hypothyroidism and Alport syndrome (normal kidney function; patient says she has \"micro heme\" in her urine).  Patient has chronic right sciatic pain.      /82   Pulse 107   Temp 36.6 °C (97.9 °F) (Oral)   Resp 17   Ht 1.524 m (5')   Wt 76.6 kg (168 lb 14 oz)   LMP 2023 (Exact Date)   SpO2 (!) 93%   BMI 32.98 kg/m²       Relevant Problems   Endocrine   (+) Hypothyroidism      HEENT   (+) Alport syndrome (HHS-HCC)      ID   (+) Vulvovaginal candidiasis      GYN   (+) 38 weeks gestation of pregnancy ([facility])       Clinical information reviewed:   Tobacco  Allergies  Meds  Problems  Med Hx  Surg Hx   Fam Hx          NPO Detail:  NPO/Void Status  Date of Last Liquid: 25  Time of Last Liquid: 1800  Date of Last Solid: 25  Time of Last Solid: 1800         OB/Gyn Evaluation    Present Pregnancy    Patient is pregnant now.   Obstetric History                Physical Exam    Airway  Mallampati: III  TM distance: >3 FB  Neck ROM: full  Mouth opening: 3 or more finger widths     Cardiovascular   Rhythm: regular  Rate: normal     Dental   Comments: Lower permanent retainer     Pulmonary Breath sounds clear to auscultation     Abdominal   Comments: gravid           Anesthesia Plan    History of general anesthesia?: yes  History of complications of general anesthesia?: no    ASA 2     epidural   (Patient has no history of problems with anesthesia  Patient has no family history of problems with anesthesia     I informed and discussed the risks and benefits of general, spinal and epidural anesthesia with the patient.  The patient expressed her understanding and her questions were answered.  A verbal consent was given by the patient.  )  The patient is not a current smoker.    Anesthetic plan and risks " discussed with patient.  Use of blood products discussed with patient who consented to blood products.

## 2025-06-14 NOTE — ANESTHESIA PROCEDURE NOTES
Spinal Block    Patient location during procedure: OB  Start time: 6/14/2025 12:42 PM  End time: 6/14/2025 12:49 PM  Reason for block: procedure for pain  Staffing  Performed: CRNA   Authorized by: MINISTERIO Wilkinson    Performed by: MINISTERIO Wilkinson    Preanesthetic Checklist  Completed: patient identified, IV checked, risks and benefits discussed, surgical consent, pre-op evaluation, timeout performed and sterile techniques followed  Block Timeout  RN/Licensed healthcare professional reads aloud to the Anesthesia provider and entire team: Patient identity, procedure with side and site, patient position, and as applicable the availability of implants/special equipment/special requirements.  Patient on coagulant treatment: no  Timeout performed at: 6/14/2025 12:43 PM  Spinal Block  Patient position: sitting  Prep: ChloraPrep  Sterility prep: cap, drape, gloves, hand hygiene and mask  Sedation level: no sedation  Patient monitoring: blood pressure, continuous pulse oximetry and heart rate  Approach: midline  Vertebral space: L4-5  Injection technique: single-shot  Needle  Needle type: pencil-point   Needle gauge: 24 G  Needle length: 3.5 in  Free flowing CSF: yes    Assessment  Sensory level: other (S1) bilateral  Block outcome: patient comfortable  Procedure assessment: patient tolerated procedure well with no immediate complications  Additional Notes  Spinal at OB request for post delivery vaginal repair.

## 2025-06-14 NOTE — ANESTHESIA POSTPROCEDURE EVALUATION
Patient: Sugey Dasilva    Procedure Summary       Date: 06/14/25 Room / Location:     Anesthesia Start: 1242 Anesthesia Stop: 1337    Procedure: Labor Analgesia Diagnosis:     Scheduled Providers:  Responsible Provider: MINISTERIO Wilkinson    Anesthesia Type: spinal ASA Status: 2            Anesthesia Type: spinal    Vitals Value Taken Time   /56 06/14/25 13:30   Temp 97 06/14/25 13:37   Pulse 94 06/14/25 13:34   Resp 12 06/14/25 13:37   SpO2 96 % 06/14/25 13:34       Anesthesia Post Evaluation    Patient location during evaluation: bedside  Patient participation: complete - patient participated  Level of consciousness: awake  Pain score: 0  Pain management: adequate  Airway patency: patent  Cardiovascular status: acceptable and hemodynamically stable  Respiratory status: acceptable, nonlabored ventilation and spontaneous ventilation  Hydration status: acceptable  Postoperative Nausea and Vomiting: none        No notable events documented.

## 2025-06-14 NOTE — CARE PLAN
The patient's goals for the shift include bond    The clinical goals for the shift include v/s stable    Over the shift, the patient did make progress toward the following goals.

## 2025-06-15 PROCEDURE — 2500000004 HC RX 250 GENERAL PHARMACY W/ HCPCS (ALT 636 FOR OP/ED): Performed by: STUDENT IN AN ORGANIZED HEALTH CARE EDUCATION/TRAINING PROGRAM

## 2025-06-15 PROCEDURE — 1220000001 HC OB SEMI-PRIVATE ROOM DAILY

## 2025-06-15 PROCEDURE — 2500000002 HC RX 250 W HCPCS SELF ADMINISTERED DRUGS (ALT 637 FOR MEDICARE OP, ALT 636 FOR OP/ED): Performed by: OBSTETRICS & GYNECOLOGY

## 2025-06-15 PROCEDURE — 2500000001 HC RX 250 WO HCPCS SELF ADMINISTERED DRUGS (ALT 637 FOR MEDICARE OP): Performed by: ADVANCED PRACTICE MIDWIFE

## 2025-06-15 RX ORDER — POLYETHYLENE GLYCOL 3350 17 G/17G
17 POWDER, FOR SOLUTION ORAL 2 TIMES DAILY
Status: DISCONTINUED | OUTPATIENT
Start: 2025-06-15 | End: 2025-06-16 | Stop reason: HOSPADM

## 2025-06-15 RX ORDER — LEVOTHYROXINE SODIUM 50 UG/1
50 TABLET ORAL DAILY
Status: DISCONTINUED | OUTPATIENT
Start: 2025-06-15 | End: 2025-06-16 | Stop reason: HOSPADM

## 2025-06-15 RX ADMIN — POLYETHYLENE GLYCOL 3350 17 G: 17 POWDER, FOR SOLUTION ORAL at 21:44

## 2025-06-15 RX ADMIN — IBUPROFEN 600 MG: 600 TABLET ORAL at 04:40

## 2025-06-15 RX ADMIN — POLYETHYLENE GLYCOL 3350 17 G: 17 POWDER, FOR SOLUTION ORAL at 10:33

## 2025-06-15 RX ADMIN — ACETAMINOPHEN 975 MG: 325 TABLET ORAL at 10:27

## 2025-06-15 RX ADMIN — ACETAMINOPHEN 975 MG: 325 TABLET ORAL at 04:40

## 2025-06-15 RX ADMIN — IBUPROFEN 600 MG: 600 TABLET ORAL at 22:20

## 2025-06-15 RX ADMIN — IBUPROFEN 600 MG: 600 TABLET ORAL at 10:27

## 2025-06-15 RX ADMIN — LEVOTHYROXINE SODIUM 50 MCG: 0.05 TABLET ORAL at 06:05

## 2025-06-15 RX ADMIN — ACETAMINOPHEN 975 MG: 325 TABLET ORAL at 22:21

## 2025-06-15 RX ADMIN — IBUPROFEN 600 MG: 600 TABLET ORAL at 16:26

## 2025-06-15 RX ADMIN — ACETAMINOPHEN 975 MG: 325 TABLET ORAL at 16:26

## 2025-06-15 ASSESSMENT — PAIN SCALES - GENERAL
PAINLEVEL_OUTOF10: 3
PAINLEVEL_OUTOF10: 3
PAINLEVEL_OUTOF10: 1
PAINLEVEL_OUTOF10: 2
PAINLEVEL_OUTOF10: 0 - NO PAIN
PAINLEVEL_OUTOF10: 2
PAINLEVEL_OUTOF10: 2

## 2025-06-15 ASSESSMENT — PAIN - FUNCTIONAL ASSESSMENT: PAIN_FUNCTIONAL_ASSESSMENT: 0-10

## 2025-06-15 ASSESSMENT — PAIN DESCRIPTION - DESCRIPTORS
DESCRIPTORS: SORE

## 2025-06-15 NOTE — LACTATION NOTE
Lactation Consultant Note  Lactation Consultation  Reason for Consult: Initial assessment    Maternal Information  Has mother  before?: No  Exclusive Pump and Bottle Feed: No  WIC Program: No    Maternal Assessment  Breast Assessment: Medium, Filling, Soft, Warm, Compressible  Nipple Assessment: Blistered, Bruised, Erect, Compression stripe, Creased after feeding, Scabbed, Sore  Alterations in Nipple Condition: Stage II - abrasions, shallow crack/fissure, stripe  Areola Assessment: Normal    Infant Assessment  Infant Behavior: Awake  Infant Assessment: Sublingual frenulum (comment), Palate - high/arch/bubble/cleft, Palate - high/arch/bubble/normal, Tongue humped/bunched/retracted/elevated, Receding chin, Poor occlusion of lips around areola    Feeding Assessment  Nutrition Source: Breastmilk  Feeding Method: Nursing at the breast, Cup Feeding  Feeding Position: Baby led, Laid back, One side per feeding, Cradle, Skin to skin, Nipple to nose, Mother needs assistance with latch/positioning  Suck/Feeding: Unsustained, Content after feeding, Audible swallowing  Latch Assessment: Moderate assistance is needed, Latch is painful, Bursts of sucking, swallowing, and rest, Pain during feeding, Deep latch obtained, Sucking and swallowing, Sucks with long jaw movement, Flanged lips    LATCH TOOL  Latch: Grasps breast, tongue down, lips flanged, rhythmic sucking  Audible Swallowing: Spontaneous and intermittent (24 hours old)  Type of Nipple: Everted (After stimulation)  Comfort (Breast/Nipple): Filling, red/small blisters/bruises, mild/moderate discomfort  Hold (Positioning): Minimal assist, teach one side, mother does other, staff holds  LATCH Score: 8    Breast Pump  Pump: Hand expression  Frequency: 8-10 times per day  Duration: 15-20 minutes per session  Volume of Milk Production: 3  Units of Volume: mL per session    Other OB Lactation Tools       Patient Follow-up  Inpatient Lactation Follow-up Needed :  Yes  Outpatient Lactation Follow-up: Recommended    Other OB Lactation Documentation  Maternal Risk Factors: Age over 30, primiparity  Infant Risk Factors: Poor or painful latch / restricted feedings    Recommendations/Summary  Called for assistance to latch.  Peds informed mom that baby had a tongue tie but should cause problems.  Mom states she was tongue tied and would be getting it revised sooner than later.  Her nephew saw [facility] and she would be going there as well.  Noted an anterior tongue tie as  well as a tight labial frenulum.. Mom denies pain but said it wasn't comfortable.   Mom has scabs and compression stripe.  Assisted with deeper latch , baby has good swallows with breast compressions.  Mom able to independently latch baby.  Baby had msf and does recall changing a stool since birth.  Mom hand expressing about 3 ml ebm and cup fed after nursing.  Mom will continue to do this every feed dicussed pumping when milk in.  Rev normal feeding patterns and pacifier use.

## 2025-06-15 NOTE — CARE PLAN
The patient's goals for the shift include Bond with baby    The clinical goals for the shift include Vital signs remain Stable    Over the shift, the patient did make progress toward the following goals.

## 2025-06-15 NOTE — PROGRESS NOTES
Postpartum Progress Note    Assessment/Plan   Sugey Dasilva is a 34 y.o., , who delivered at 38w1d gestation and is now postpartum day 1.    s/p    -Pain well controlled with PO pain meds  -Afebrile, ambulating   -Tolerating regular diet with anti-emetics PRN and bowel regimen ordered  -Voiding spontaneously   -Admission hgb 13 --> EBL 458cc; Continue to monitor for si/sx of anemia.   -Rh positive   -Rubella immune  -Lactation consultant PRN  -Male infant, desires circ, will complete once cleared by peds    OASIS  -3c perineal laceration  -s/p ancef/flagyl  -Bowel regimen ordered  -For [facility] referral on discharge    Elevated BP  -Isolated mild range BP in labor  -Severe range BP in recovery that was deemed erroneous (225/156 -> 106/65 on recheck)  -No current diagnosis of hypertensive disorder    Hypothyroid  -Home synthroid continued     Dispo: anticipate discharge on PPD#2 if continues to meet milestones. Plan for follow up in 4-6 weeks for postpartum visit with OB provider.     Marielos Smith MD     Subjective   Minimal cramping, lochia light. Tolerating PO. Some soreness at her repair. Voiding freely. Breast feeding and baby doing well.    Objective     Last Vitals:  Temp Pulse Resp BP MAP Pulse Ox   36.8 °C (98.2 °F) 99 16 116/72 82 98 %     Vitals Min/Max Last 24 Hours:  Temp  Min: 36.6 °C (97.9 °F)  Max: 37 °C (98.6 °F)  Pulse  Min: 86  Max: 125  Resp  Min: 16  Max: 18  BP  Min: 98/67  Max: 225/156  MAP (mmHg)  Min: 73  Max: 180    Intake/Output:     Intake/Output Summary (Last 24 hours) at 6/15/2025 0941  Last data filed at 2025 1700  Gross per 24 hour   Intake --   Output 1458 ml   Net -1458 ml       Physical Exam:  Gen: awake, alert  Head: NCAT  HEENT: moist mucus membranes  Pulm: breathing comfortably on room air  CV: warm and well-perfused  Neuro: alert and oriented  Psych: appropriate affect     Lab Data:  Labs in chart were reviewed.

## 2025-06-15 NOTE — PROGRESS NOTES
Spiritual Care Visit  Spiritual Care Request    Reason for Visit:  Routine Visit: Introduction  Continue Visiting: No     Request Received From:       Focus of Care:  Visited With: Patient and family together         Refer to :          Spiritual Care Assessment    Spiritual Assessment:                      Care Provided:  Intended Effects: Establish rapport and connectedness, June affirmation, Build relationship of care and support, Demonstrate caring and concern  Methods: Offer spiritual/Jehovah's witness support  Interventions: [place], Perform a Jehovah's witness rite or ritual    Sense of Community and or Tenriism Affiliation:  Anglican         Addressed Needs/Concerns and/or [place] Through:  Tenriism Encounters  Tenriism Needs: Prayer, Literature  Sacramental Encounters  Communion: Ask the patient  Communion Given Indicator: Yes    Outcome:  Outcome of Spiritual Care Visit: Affirmation, [place]     Advance Directives:         Spiritual Care Annotation    Annotation:

## 2025-06-15 NOTE — CARE PLAN
The patient's goals for the shift include Bond with baby    The clinical goals for the shift include Vital signs remain Stable      Problem: Pain - Adult  Goal: Verbalizes/displays adequate comfort level or baseline comfort level  Outcome: Progressing  Flowsheets (Taken 2025)  Verbalizes/displays adequate comfort level or baseline comfort level:   Encourage patient to monitor pain and request assistance   Assess pain using appropriate pain scale   Consider cultural and social influences on pain and pain management     Problem: Safety - Adult  Goal: Free from fall injury  Outcome: Progressing  Flowsheets (Taken 2025)  Free from fall injury: Instruct family/caregiver on patient safety     Problem: Discharge Planning  Goal: Discharge to home or other facility with appropriate resources  Outcome: Progressing  Flowsheets (Taken 2025)  Discharge to home or other facility with appropriate resources:   Identify barriers to discharge with patient and caregiver   Arrange for needed discharge resources and transportation as appropriate   Identify discharge learning needs (meds, wound care, etc)     Problem: Postpartum  Goal: Experiences normal postpartum course  Outcome: Progressing  Flowsheets (Taken 6/15/2025 0622)  Experiences normal postpartum course:   Monitor maternal vital signs   Assess uterine involution   Med administration/monitoring of effect   Fundal and lochia asssessments w/fundal massage, bladder emptying   Assist with identification of coping methods and supprot resources  Goal: Appropriate maternal -  bonding  Outcome: Progressing  Flowsheets (Taken 6/15/2025 0622)  Appropriate maternal- bonding:   Identify family support   24-hour rooming in  Goal: Establish and maintain infant feeding pattern for adequate nutrition  Outcome: Progressing  Flowsheets (Taken 6/15/2025 0622)  Establish and maintain infant feeding pattern for adequate nutrition:   Assess  human milk  feeding   Refer to lactation consultant as needed   Encourage feeding and/or pumping at least 8x/day

## 2025-06-16 ENCOUNTER — PHARMACY VISIT (OUTPATIENT)
Dept: PHARMACY | Facility: CLINIC | Age: 35
End: 2025-06-16
Payer: COMMERCIAL

## 2025-06-16 VITALS
DIASTOLIC BLOOD PRESSURE: 77 MMHG | WEIGHT: 168.87 LBS | BODY MASS INDEX: 33.15 KG/M2 | OXYGEN SATURATION: 98 % | SYSTOLIC BLOOD PRESSURE: 117 MMHG | RESPIRATION RATE: 17 BRPM | HEIGHT: 60 IN | HEART RATE: 90 BPM | TEMPERATURE: 98.1 F

## 2025-06-16 PROBLEM — Z3A.38 38 WEEKS GESTATION OF PREGNANCY (HHS-HCC): Status: RESOLVED | Noted: 2025-05-28 | Resolved: 2025-06-16

## 2025-06-16 PROBLEM — B37.31 VULVOVAGINAL CANDIDIASIS: Status: RESOLVED | Noted: 2025-05-28 | Resolved: 2025-06-16

## 2025-06-16 PROBLEM — O44.40 LOW-LYING PLACENTA (HHS-HCC): Status: RESOLVED | Noted: 2025-02-21 | Resolved: 2025-06-16

## 2025-06-16 PROBLEM — O36.5990 FETAL GROWTH RESTRICTION ANTEPARTUM (HHS-HCC): Status: RESOLVED | Noted: 2025-04-23 | Resolved: 2025-06-16

## 2025-06-16 PROCEDURE — 2500000001 HC RX 250 WO HCPCS SELF ADMINISTERED DRUGS (ALT 637 FOR MEDICARE OP): Performed by: ADVANCED PRACTICE MIDWIFE

## 2025-06-16 PROCEDURE — 2500000002 HC RX 250 W HCPCS SELF ADMINISTERED DRUGS (ALT 637 FOR MEDICARE OP, ALT 636 FOR OP/ED): Performed by: OBSTETRICS & GYNECOLOGY

## 2025-06-16 PROCEDURE — RXMED WILLOW AMBULATORY MEDICATION CHARGE

## 2025-06-16 RX ORDER — ACETAMINOPHEN 500 MG
1000 TABLET ORAL EVERY 6 HOURS PRN
Qty: 30 TABLET | Refills: 0 | Status: SHIPPED | OUTPATIENT
Start: 2025-06-16

## 2025-06-16 RX ORDER — DOCUSATE SODIUM 100 MG/1
100 CAPSULE, LIQUID FILLED ORAL 2 TIMES DAILY PRN
Qty: 60 CAPSULE | Refills: 0 | Status: SHIPPED | OUTPATIENT
Start: 2025-06-16

## 2025-06-16 RX ORDER — POLYETHYLENE GLYCOL 3350 17 G/17G
17 POWDER, FOR SOLUTION ORAL DAILY
Qty: 510 G | Refills: 0 | Status: SHIPPED | OUTPATIENT
Start: 2025-06-16 | End: 2025-07-16

## 2025-06-16 RX ORDER — IBUPROFEN 600 MG/1
600 TABLET, FILM COATED ORAL EVERY 6 HOURS PRN
Qty: 30 TABLET | Refills: 0 | Status: SHIPPED | OUTPATIENT
Start: 2025-06-16

## 2025-06-16 RX ADMIN — IBUPROFEN 600 MG: 600 TABLET ORAL at 11:21

## 2025-06-16 RX ADMIN — ACETAMINOPHEN 975 MG: 325 TABLET ORAL at 11:21

## 2025-06-16 RX ADMIN — LEVOTHYROXINE SODIUM 50 MCG: 0.05 TABLET ORAL at 07:51

## 2025-06-16 RX ADMIN — ACETAMINOPHEN 975 MG: 325 TABLET ORAL at 04:29

## 2025-06-16 RX ADMIN — IBUPROFEN 600 MG: 600 TABLET ORAL at 04:29

## 2025-06-16 ASSESSMENT — PAIN DESCRIPTION - DESCRIPTORS
DESCRIPTORS: DISCOMFORT;SORE
DESCRIPTORS: DISCOMFORT;SORE

## 2025-06-16 ASSESSMENT — PAIN SCALES - GENERAL
PAINLEVEL_OUTOF10: 1
PAINLEVEL_OUTOF10: 4

## 2025-06-16 NOTE — DISCHARGE SUMMARY
Discharge Summary    Admission Date: 2025  Discharge Date: 25      Discharge Diagnosis  38 weeks gestation of pregnancy ([facility])    Hospital Course  Delivery Date: 2025 12:17 PM  Delivery type: Vaginal, Spontaneous   GA at delivery: 38w1d  Outcome: Living  Anesthesia during delivery: Spinal  Intrapartum complications: None  Feeding method: Breastfeeding Status: Yes     33 yo  underwent IOL at 38 weeks in the setting of FGR.  Had  complicated by 3c laceration.  Did well postpartum, using sitz bath, pain controlled.  Has ERADS followup and stool softeners and laxatives.  Warning signs of breakdown, infection discussed.    Pertinent Physical Exam At Time of Discharge  Visit Vitals  /77   Pulse 90   Temp 36.7 °C (98.1 °F) (Oral)   Resp 17       Gen: well nourished, NAD    Abd: softly distended, nontender no organomegaly  Fundus firm nontender below umbilicus  Ext: no edema, nontender, negative Kirstin's        Last Vitals:  Temp Pulse Resp BP MAP Pulse Ox   36.7 °C (98.1 °F) 90 17 117/77 91 98 %     Discharge Meds     Your medication list        START taking these medications        Instructions Last Dose Given Next Dose Due   acetaminophen 500 mg tablet  Commonly known as: Tylenol Extra Strength      Take 2 tablets (1,000 mg) by mouth every 6 hours if needed for mild pain (1 - 3).       docusate sodium 100 mg capsule  Commonly known as: Colace      Take 1 capsule (100 mg) by mouth 2 times a day as needed for constipation.       ibuprofen 600 mg tablet      Take 1 tablet (600 mg) by mouth every 6 hours if needed for mild pain (1 - 3).       polyethylene glycol 17 gram/dose powder  Commonly known as: Glycolax, Miralax      Mix 17 g of powder and drink once daily.              CHANGE how you take these medications        Instructions Last Dose Given Next Dose Due   levothyroxine 50 mcg tablet  Commonly known as: Synthroid, Levoxyl  What changed: Another medication with the same name was  removed. Continue taking this medication, and follow the directions you see here.      Take 1 tablet (50 mcg) by mouth once daily.              CONTINUE taking these medications        Instructions Last Dose Given Next Dose Due   magnesium oxide 300 mg magnesium tablet           PRENATAL VITAMIN ORAL                  STOP taking these medications      BABY ASPIRIN ORAL                  Where to Get Your Medications        These medications were sent to [facility]  [address] [address]      Hours: 8:30 AM to 5:00 PM Mon-Fri Phone: [phone]   acetaminophen 500 mg tablet  docusate sodium 100 mg capsule  ibuprofen 600 mg tablet  polyethylene glycol 17 gram/dose powder          Complications Requiring Follow-Up  3c laceration, home with sitz bath daily, ERADs referral made should have appointment within 2 weeks postpartum    Test Results Pending At Discharge  Pending Labs       No current pending labs.            Outpatient Follow-Up  Future Appointments   Date Time Provider Department Center   8/4/2025  1:00 PM Mayela Morrison MD [facility] spent 30 minutes in the professional and overall care of this patient.      Shania Ji MD

## 2025-06-16 NOTE — CARE PLAN
The patient's goals for the shift include bond with baby    The clinical goals for the shift include vitals to reman wdl      Problem: Pain - Adult  Goal: Verbalizes/displays adequate comfort level or baseline comfort level  Outcome: Met  Flowsheets (Taken 2025 1136)  Verbalizes/displays adequate comfort level or baseline comfort level:   Encourage patient to monitor pain and request assistance   Assess pain using appropriate pain scale   Administer analgesics based on type and severity of pain and evaluate response     Problem: Safety - Adult  Goal: Free from fall injury  Outcome: Met  Flowsheets (Taken 2025 113)  Free from fall injury:   Instruct family/caregiver on patient safety   Based on caregiver fall risk screen, instruct family/caregiver to ask for assistance with transferring infant if caregiver noted to have fall risk factors     Problem: Discharge Planning  Goal: Discharge to home or other facility with appropriate resources  Outcome: Met  Flowsheets (Taken 2025 113)  Discharge to home or other facility with appropriate resources:   Identify barriers to discharge with patient and caregiver   Arrange for needed discharge resources and transportation as appropriate   Identify discharge learning needs (meds, wound care, etc)     Problem: Postpartum  Goal: Experiences normal postpartum course  Outcome: Met  Flowsheets (Taken 2025 1136)  Experiences normal postpartum course:   Monitor maternal vital signs   Med administration/monitoring of effect   Fundal and lochia asssessments w/fundal massage, bladder emptying  Goal: Appropriate maternal -  bonding  Outcome: Met  Flowsheets (Taken 2025 1136)  Appropriate maternal- bonding:   Identify family support   Encourage NICU visitation if infant is in NICU   Referral to  or  as needed  Goal: Establish and maintain infant feeding pattern for adequate nutrition  Outcome: Met  Flowsheets (Taken 2025  1136)  Establish and maintain infant feeding pattern for adequate nutrition:   Assess  human milk feeding   Refer to lactation consultant as needed

## 2025-06-16 NOTE — LACTATION NOTE
Lactation Consultant Note  Lactation Consultation  Reason for Consult: Follow-up assessment  Consultant Name: Samia Aldridge    Maternal Information  Has mother  before?: No  Exclusive Pump and Bottle Feed: No  WIC Program: No    Maternal Assessment  Breast Assessment: Medium, Filling, Compressible  Nipple Assessment: Compression stripe, Sore, Erect  Alterations in Nipple Condition: Stage II - abrasions, shallow crack/fissure, stripe  Areola Assessment: Normal    Infant Assessment  Infant Behavior: Awake, Rooting response, Sucking  Infant Assessment: Tongue protrudes over alveolar ridge, Sublingual frenulum (comment) (Frenulum attaches close to apex of tongue. Limited elevation.)    Feeding Assessment  Nutrition Source: Breastmilk  Feeding Method: Nursing at the breast  Feeding Position: Cradle, Breast sandwich, Mother needs assistance with latch/positioning, Nipple to nose, Nose lightly touching breast, Chin tucked into breast  Suck/Feeding: Sustained, Baby led rhythmically, Audible swallowing  Latch Assessment: Minimal assistance is needed, Instructed on deep latch, Latch achieved, Wide open mouth < 160, Bursts of sucking, swallowing, and rest, Latch is painful    LATCH TOOL  Latch: Grasps breast, tongue down, lips flanged, rhythmic sucking  Audible Swallowing: Spontaneous and intermittent (24 hours old)  Type of Nipple: Everted (After stimulation)  Comfort (Breast/Nipple): Filling, red/small blisters/bruises, mild/moderate discomfort  Hold (Positioning): Minimal assist, teach one side, mother does other, staff holds  LATCH Score: 8    Breast Pump  Pump: Hand expression  Frequency: Other (comment) (As needed for poor feeding/extra volume)  Duration: 15-20 minutes per session  Volume of Milk Production: 3  Units of Volume: mL per session    Other OB Lactation Tools  Lactation Tools: Lanolin    Patient Follow-up  Inpatient Lactation Follow-up Needed : No  Outpatient Lactation Follow-up: Recommended  Lactation  "Professional - OK to Discharge: Yes    Other OB Lactation Documentation  Maternal Risk Factors: Age over 30, primiparity, Hypothyroidism, Other (comment) (Alport Syndrome)  Infant Risk Factors: Early term birth 37-39 weeks, Poor or painful latch / restricted feedings    Recommendations/Summary  See previous note for history. , 38.1 weeks.  on @1217. Birthweight 2722g, 5% weight loss. TCB 8.5@39 hours. Parents endorse tongue tie, appointment scheduled with Tanesha Bartlett for Friday. Parents report latching is going well, mild pain with feeding. Bilateral compression stripes noted, healing. Infant awake and rooting at time of consult. Oral exam done, lingual frenulum attaches near apex of tongue with heart shaping noted. Elevates 50% of the way to roof of mouth. Extends to alveolar ridge. Good cupping with sucking, moderate suction noted.   Mother latching in cross cradle, fingers too narrow with breast sandwich, shallow latching noted, mother endorses pain. Taught ABC's of good positioning: arms around breast, infant belly to belly with parent, infant's curve of hip to parent's curve of body. Taught to have infant rest their chin on the breast below the areola. Parents instructed to wait for gape reflex elicited by chin pressure on the breast, before hugging infant close to facilitate latching. Parents demonstrate technique with minimal assistance from IBCLC to time latching. Infant able to latch deeply with wide gape and sustained rhythmical sucking. Comfort of latch improved from 5/10 pinching pain to 3/10 \"tolerable\".  Discharge teaching reviewed. Taught engorgement management. Reviewed s/s of plugged ducts and mastitis and treatment. Reviewed normal feeding patterns of the “4th trimester” and outpatient support.        "

## 2025-06-16 NOTE — CARE PLAN
The patient's goals for the shift include Bond with baby    The clinical goals for the shift include Vital signs remain Stable      Problem: Pain - Adult  Goal: Verbalizes/displays adequate comfort level or baseline comfort level  Outcome: Progressing  Flowsheets (Taken 2025 by Grace Lamar RN)  Verbalizes/displays adequate comfort level or baseline comfort level:   Encourage patient to monitor pain and request assistance   Assess pain using appropriate pain scale   Consider cultural and social influences on pain and pain management     Problem: Safety - Adult  Goal: Free from fall injury  Outcome: Progressing  Flowsheets (Taken 2025 by Grace Lamar RN)  Free from fall injury: Instruct family/caregiver on patient safety     Problem: Discharge Planning  Goal: Discharge to home or other facility with appropriate resources  Outcome: Progressing  Flowsheets (Taken 2025 by Grace Lamar RN)  Discharge to home or other facility with appropriate resources:   Identify barriers to discharge with patient and caregiver   Arrange for needed discharge resources and transportation as appropriate   Identify discharge learning needs (meds, wound care, etc)     Problem: Postpartum  Goal: Experiences normal postpartum course  Outcome: Progressing  Flowsheets (Taken 6/15/2025 0622 by Grace Lamar, RN)  Experiences normal postpartum course:   Monitor maternal vital signs   Assess uterine involution   Med administration/monitoring of effect   Fundal and lochia asssessments w/fundal massage, bladder emptying   Assist with identification of coping methods and supprot resources  Goal: Appropriate maternal -  bonding  Outcome: Progressing  Flowsheets (Taken 6/15/2025 0622 by Grace Lamar RN)  Appropriate maternal- bonding:   Identify family support   24-hour rooming in  Goal: Establish and maintain infant feeding pattern for adequate nutrition  Outcome: Progressing  Flowsheets  (Taken 6/15/2025 0622 by Grace Lamar RN)  Establish and maintain infant feeding pattern for adequate nutrition:   Assess  human milk feeding   Refer to lactation consultant as needed   Encourage feeding and/or pumping at least 8x/day

## 2025-06-17 ENCOUNTER — APPOINTMENT (OUTPATIENT)
Dept: OBSTETRICS AND GYNECOLOGY | Facility: CLINIC | Age: 35
End: 2025-06-17
Payer: COMMERCIAL

## 2025-06-19 ENCOUNTER — LACTATION CONSULT (OUTPATIENT)
Dept: LACTATION | Facility: CLINIC | Age: 35
End: 2025-06-19
Payer: COMMERCIAL

## 2025-06-19 DIAGNOSIS — O92.70 LACTATION PROBLEM (HHS-HCC): Primary | ICD-10-CM

## 2025-06-19 PROCEDURE — 99211 OFF/OP EST MAY X REQ PHY/QHP: CPT | Performed by: EMERGENCY MEDICINE

## 2025-06-19 NOTE — PROGRESS NOTES
Lactation Counseling Note    Subjective:    Sugey Dasilva is a 34 y.o. patient referred for lactation counseling. She is here today due to Latch issues and post frenotomy assessment and assistance. She was referred by her tita LC.     OB HISTORY:   Baby Name: Chery  Healthcare Provider: OB/GYN Dr Morrison  /Para: : 1  Para: 1  Weeks Gestation: 38  Mode of Delivery: Normal vaginal route  Induction/Augmentation  Delivery Complications: None  Maternal Complications: 3 degree tear  [place] Information: Baby's Name: Chery  : 25  [place] Birth[place]  Healthcare Provider:   Skin to skin contact: First hour  Birth parameters: SGA  Birth weight: 6 lb 0oz    Objective:    BREASTFEEDING ASSESSMENT:   Physical Exam    Breast Assessment: Large, Pendulous, Full, Compressible, and Readiness to feed  Nipple Assessment/Stage: short, erect with stimulation, compression stripe, rounded after feeding, and scabbed Stage II - Abrasions, shallow crack or fissure, stripe  Areola: Normal  Feeding Position: baby - led, c - hold, cradle, cross - cradle, laid back, skin to skin, both sides, nipple to nose, mother needs assistance with latch/positioning, nose or chin not touching breast, and baby's head too high over breast  Latch: moderate assistance is needed, instructed on deep latch, eagerly grasped on to latch, areolar attachment, deep latch obtained, wide open mouth < 160, comfortable with no pain, sucking and swallowing, sucks with long jaw movement, chin and lower lip contact breast first, upper lip turned in, comfortable latch, and frequent audible swallows  Suck/Feeding: sustained, coordinated suck/swallow/breathe, tactile stimulation needed, audible swallowing, audible swallowing with stimulation, and content after feeding  Infant Feeding Method: Breast milk only  Infant Behavior: quiet alert, light sleep, readiness to feed, feeding cues observed, rooting response, suckles on and off, needs  "stimulation, and content after feeding  Infant Information: Jaundice  Post status frenotomy  Receding chin  Good cupping of tongue  Good lateral movement of the tongue  Able to elevate tongue to roof of mouth  Fontanel: flat  Mucous Membranes: moist  Breast Pain: none  Nipple Pain: Pain scale 0-10 (10 most pain): 5  Pain description: sore  Before feeding pain 0-10 (10 most pain): 5  After adjustment pain 0-10 (10 most pain): 0  Other pain relief methods: deeper off center latch; flanged lips, positioning  Weight: Weight before feedin lb 10.9 oz  Weight after feedin lb 13.0 oz  Amount of breast milk transferred: 2.1 oz ml  Number of voids in the last 24 hours: 4 since midnight  Number of stools in the last 24 hours: 4 since midnight    PATIENT DISCUSSION SUMMARY: Mom and baby Chery here for first visit. Baby is 5 days of life and had tongue and lip revisions done at [facility] 2 days ago. Mom states she that latch is beginning to feel better. Baby weighed prior to feed at 5 lb 10.9 oz. Assisted to left, least sore, breast first. Shown cross cradle for latch on and off center approach. Baby latched on deeply after just a couple attempts. Shown how to flange upper lip and bring baby into \"sniffing\" position. Good sucks and swallows audible. Shown breast massage and infant stimulation to keep sucking effective as long as possible. Nipple round when released. Transferred 1.4 oz per test weight. Very good transfer. Aftercare stretches reviewed and demonstrated. Wounds look appropriate for 2 days post procedure. Assisted to right breast. Noted scabbed compression stripe. Assisted in similar manner to latch more deeply. Infant latched readily with wide gape. Upper lip needed flanged. Good rhythmic sucks and swallows. Infant not quite as vigorous and released nipple when done. Nipple noted to be round. Baby weighed again and transferred another 0.7 oz for a total of 2.1 oz this feeding. Very good for 5 day old " baby. Mom feeling increased confidence. Given hydrogels with instructions for comfort and healing, also encouraged expressed milk to nipples to aid healing. Mom plans to return Monday, June 23,  for further follow up.     LACTATION PROBLEMS AND STRATEGIES:  Problems latching baby to breast: Baby should latch to areola (dark area) not just the nipple.  Baby's lips should be flanged outward.  Bring the baby up to the level of your breast by putting a pillow under the baby.  Do not use bottles or pacifiers until latching on well.  Dribble milk over the nipple or express milk so that baby can taste it  Encourage a deeper latch with the asymetrical latch- lining baby's nose opposite mother's nipple.  Encourage nipple to stand up prior to feeing by pumping, nipple rolling or by brief application of ice.  Gently tickle your baby's lip with your nipple to encourage your baby to open his/her mouth wide.  Hold baby so that your breast is positioned deep in the baby's mouth.  Hold your baby close, tummy to tummy.  If baby does not latch to breast, express breast milk.  If engorged, express some milk to soften breast.  If the baby is not latched on well, break seal and gently try again.  Keep baby skin to skin and watch for feeding cues.  Massage breast to start milk-ejection reflex.  Place nipple and at least 1 inch of areola into baby's mouth.  Place your hand behind the baby's neck and shoulder.  Do not force baby's head into breast.  Put baby in the football hold or clutch hold, supporting his/her neck and head in sniffing position to open his/her throat.  Shape breast into oval shape (sandwich hold)  for deep latch.  Try different feeding positions (cradle, football, side etc.).  Use a breast feeding pillow to bring baby up to the level of the breast.  Use nipple shield and monitor baby's weight gain and output.  Use semi-reclined feeding position to allow baby to take an active role and trigger baby's inborn feeding  behavior.  Use your hand to support your breast during feeding.  When your baby's mouth is wide open, quickly pull baby into your breast.  Your baby's chin should be pressed into your breast.  PATIENT INSTRUCTION HANDOUTS GIVEN:      LACTATION EDUCATION:  Correct Positioning and Correct Latch On

## 2025-06-19 NOTE — PATIENT INSTRUCTIONS
"Mom and baby Bellemy here for first visit. Baby is 5 days of life and had tongue and lip revisions done at [facility] 2 days ago. Mom states she that latch is beginning to feel better. Baby weighed prior to feed at 5 lb 10.9 oz. Assisted to left, least sore, breast first. Shown cross cradle for latch on and off center approach. Baby latched on deeply after just a couple attempts. Shown how to flange upper lip and bring baby into \"sniffing\" position. Good sucks and swallows audible. Shown breast massage and infant stimulation to keep sucking effective as long as possible. Nipple round when released. Transferred 1.4 oz per test weight. Very good transfer. Aftercare stretches reviewed and demonstrated. Wounds look appropriate for 2 days post procedure. Assisted to right breast. Noted scabbed compression stripe. Assisted in similar manner to latch more deeply. Infant latched readily with wide gape. Upper lip needed flanged. Good rhythmic sucks and swallows. Infant not quite as vigorous and released nipple when done. Nipple noted to be round. Baby weighed again and transferred another 0.7 oz for a total of 2.1 oz this feeding. Very good for 5 day old baby. Mom feeling increased confidence. Given hydrogels with instructions for comfort and healing, also encouraged expressed milk to nipples to aid healing. Mom plans to return Monday, June 23, for further follow up.   "

## 2025-06-19 NOTE — PROGRESS NOTES
ERAD Initial Patient Visit    Sugey Dasilva is a 34 y.o. year old , referred by Dr. Ji for a 3c degree perineal laceration following vaginal delivery of baby boy on date 25.     Delivery details:   - Gestational age: 38w1d  - Delivery type:   - Infant weight: 2.722 kg  - Laceration type: 3c     Delivery/Procedure note reviewed:   Delivery of viable infant under no anesthesia. Delayed clamping was performed. The infant was placed skin to skin. Cord gases were not sent.  Cord blood was collected. Placenta delivered intact and fundus was firm     Probable 3rd degree Laceration identified and repaired. (Dr Ibarra consulted at bedside for evaluation and repair of likely 3rd degree)    Postpartum recovery:  - Vaginal pain? Manageable, some discomfort starting a couple days ago. Sometimes with certain sitting positions. Noting some itching these days.  - Rectal pain? Denies but does feel tightness  - For pain: ibu/tylenol currently  - For bowels: miralax daily  - Urinary incontinence? No  - Bowel leakage? No, but urgency  - Returned to intercourse? No  - Formula/breast feeding Breastfeeding/pumping  - Mood: doing well, supported, ups and downs but feeling more back to normal past couple days    PHYSICAL EXAMINATION:  Patient's last menstrual period was 2023 (exact date).  Body mass index is 28.71 kg/m².  /60   Ht 1.524 m (5')   Wt 66.7 kg (147 lb)   LMP 2023 (Exact Date)   Breastfeeding Yes   BMI 28.71 kg/m²     General Appearance: well appearing  Neuro: Alert and oriented   HEENT: mucous membranes moist, neck supple  Resp: No respiratory distress, normal work of breathing  MSK: normal range of motion, gait appropriate      Pelvic:  Chaperone for pelvic exam:   Genitourinary:  normal external genitalia, Bartholin's glands, Tennant's glands negative,   Urethra normal meatus, non-tender, no periurethral mass  Vaginal mucosa  normal  Cervix  not assessed  Uterus not  assessed  Adnexae not assessed    Perineum: well healed laceration, well approximated, no evidence of suture bridging, no fistulous connection noted       POP-Q (in supine position):  No significant prolapse    Rectal: no hemorrhoids, fissures or masses. Normal squeeze strength and normal resting tone. No evidence of suture transgression, wound breakdown or fistulous connection with vagina.    PVR (by ultrasound):       IMPRESSION AND PLAN:  Sugey Dasilva is a 34 y.o. year old, here for evaluation for 3c perineal laceration    3c degree laceration  - we reviewed the pathology and natural history of OASI, specifically increased risk for wound complications and pelvic floor dysfunction/bowel control difficulty later in life  - reviewed purpose of ERAD clinic to closely monitor wound healing and identify issues early before they progress  - reviewed recommendations for bowel regimen, titrating to daily soft BM  - discussed sitz bath strategy (one inch of plain water in tub/cookie sheet 5 minutes TID)  - healing appropriately on exam today  - discussed plan for follow up, anticipate next visit 4 weeks and likely cleared from further evaluation at that time if no wound healing issues identified prior to then  - reviewed warning signs: worsening pain, foul smelling discharge, seth fecal incontinence, etc    PFPT recommendation discussed and referral placed today. Has previously done PFPT but was in [place], not very convenient location for her. Amenable to new referral.    All questions and concerns were answered and addressed.  The patient expressed understanding and agrees with the plan.     Mira Man MD

## 2025-06-23 ENCOUNTER — APPOINTMENT (OUTPATIENT)
Dept: OBSTETRICS AND GYNECOLOGY | Facility: CLINIC | Age: 35
End: 2025-06-23
Payer: COMMERCIAL

## 2025-06-23 ENCOUNTER — OFFICE VISIT (OUTPATIENT)
Dept: OBSTETRICS AND GYNECOLOGY | Facility: CLINIC | Age: 35
End: 2025-06-23
Payer: COMMERCIAL

## 2025-06-23 ENCOUNTER — LACTATION CONSULT (OUTPATIENT)
Dept: LACTATION | Facility: CLINIC | Age: 35
End: 2025-06-23
Payer: COMMERCIAL

## 2025-06-23 VITALS
BODY MASS INDEX: 28.86 KG/M2 | SYSTOLIC BLOOD PRESSURE: 106 MMHG | HEIGHT: 60 IN | WEIGHT: 147 LBS | DIASTOLIC BLOOD PRESSURE: 60 MMHG

## 2025-06-23 PROCEDURE — 99214 OFFICE O/P EST MOD 30 MIN: CPT | Performed by: STUDENT IN AN ORGANIZED HEALTH CARE EDUCATION/TRAINING PROGRAM

## 2025-06-23 PROCEDURE — 1036F TOBACCO NON-USER: CPT | Performed by: STUDENT IN AN ORGANIZED HEALTH CARE EDUCATION/TRAINING PROGRAM

## 2025-06-23 PROCEDURE — 3008F BODY MASS INDEX DOCD: CPT | Performed by: STUDENT IN AN ORGANIZED HEALTH CARE EDUCATION/TRAINING PROGRAM

## 2025-06-23 ASSESSMENT — PAIN SCALES - GENERAL: PAINLEVEL_OUTOF10: 2

## 2025-06-25 ENCOUNTER — LACTATION CONSULT (OUTPATIENT)
Dept: LACTATION | Facility: CLINIC | Age: 35
End: 2025-06-25
Payer: COMMERCIAL

## 2025-06-25 DIAGNOSIS — O92.70 LACTATION PROBLEM (HHS-HCC): Primary | ICD-10-CM

## 2025-06-25 PROCEDURE — 99211 OFF/OP EST MAY X REQ PHY/QHP: CPT | Performed by: EMERGENCY MEDICINE

## 2025-06-25 NOTE — PATIENT INSTRUCTIONS
Mom here for follow-up weight check and wound check post frenotomy 8 days ago.  Mom latching baby but complaining still of a slight amount of pain and compression stripe.  Baby having a more difficult time on the right breast latch is easier on the left breast.  Encouraged mom to get somebody work the baby appeared really tight.  Mom asked that I do the next stretch when I did the stretch again the wound popped open and cause the slight amount of bleeding .  Mom very frustrated reviewed stretches with her again.  Encouraged mom to call [facility] for a follow-up as well.  Baby gained 1.2 ounces in 2 days and transferred 1.6 ounces today.  Mom did state after the wound deep stretch that it felt so much better and baby transferred better on that side.  Mom to follow-up on Friday for weight check

## 2025-06-25 NOTE — PROGRESS NOTES
Lactation Counseling Note    Subjective:    Sugey Dasilva is a 34 y.o. patient referred for lactation counseling. She is here today due to Latch issues. She was referred by her [facility] LC.     OB HISTORY:   Mode of Delivery: Normal vaginal route    Objective:    BREASTFEEDING ASSESSMENT:   Physical Exam    Breast Assessment: Large, Pendulous, Full, Soft, Warm, and Compressible  Nipple Assessment/Stage: bruised, short, erect, and scabbed Stage I - Pain or irritation with no skin breakdown  Areola: Normal  Feeding Position: baby - led, cradle, skin to skin, both sides, nipple to nose, and mother demonstrates good positioning  Latch: minimal assistance is needed, deep latch obtained, comfortable with no pain, sucks with long jaw movement, flanged lips, and correct tongue position  Suck/Feeding: sustained, audible swallowing, and content after feeding  Alternative Feeding Methods: nursing at the breast and paced bottle  Infant Feeding Method: Breast milk only  Infant Behavior: awake, quiet alert, and content after feeding  Infant Information: Jaundice  Post status frenotomy  Good cupping of tongue  Good lateral movement of the tongue  Able to elevate tongue to roof of mouth  Nipple Pain: Pain scale 0-10 (10 most pain): 2-3  Weight: Weight before feedinlbs 15.2oz  Weight after feedinlbs1.1oz  Amount of breast milk transferred: 1.9oz ml  Number of voids in the last 24 hours: 6=8   Number of stools in the last 24 hours: 3-4    PATIENT DISCUSSION SUMMARY: Here today for follow-up weight check and wound check post frenotomy.  Mom states she still feels a slight amount of pinching with nursing baby has been revised for 1 week.  Mom asked to assess baby's wound noted wound to be very closed did a stretch wound opened and had minimal amount of bleeding.  Mom upset that she was not doing stretches right I assured her that this was common reviewed stretches again encouraged to use fifth finger instead of her first finger.   After the stretch baby latched on mom stated she felt a difference and it hurt last.  Baby up 4.7 ounces in 4 days.  Mom has copious amounts of milk baby latched on after a longer feed baby transferred 1.9 ounces which is appropriate for a 1-week-old.  Encouraged mom to come back in 2 days so we can make sure the wound is staying open and baby is transferring and gaining weight.  Mom to follow-up on Wednesday    LACTATION PROBLEMS AND STRATEGIES:  Jaundice: Watch for feeding cues and nurse 10-12 times per day  Discontinue use of aspirin and other salicylates and sulfa drugs.  Call physician for further assessment of jaundice  Do not give water supplements  Expose baby to indirect sunlight  Follow pediatrician's recommendations if supplementation is necessary  Hand express or pump if baby not draining breast  If baby sleepy, place baby skin to skin and move to breast for feeding  If breastfeeding is temporarily suspended review expressing  Massage breast while feeding and listen for swallows  PATIENT INSTRUCTION HANDOUTS GIVEN:      LACTATION EDUCATION:  Correct Positioning and Correct Latch On

## 2025-06-25 NOTE — PATIENT INSTRUCTIONS
Here today for follow-up weight check and wound check post frenotomy.  Mom states she still feels a slight amount of pinching with nursing baby has been revised for 1 week.  Mom asked to assess baby's wound noted wound to be very closed did a stretch wound opened and had minimal amount of bleeding.  Mom upset that she was not doing stretches right I assured her that this was common reviewed stretches again encouraged to use fifth finger instead of her first finger.  After the stretch baby latched on mom stated she felt a difference and it hurt last.  Baby up 4.7 ounces in 4 days.  Mom has copious amounts of milk baby latched on after a longer feed baby transferred 1.9 ounces which is appropriate for a 1-week-old.  Encouraged mom to come back in 2 days so we can make sure the wound is staying open and baby is transferring and gaining weight.  Mom to follow-up on Wednesday

## 2025-06-25 NOTE — PROGRESS NOTES
Lactation Counseling Note    Subjective:    Sugey Dasilva is a 34 y.o. patient referred for lactation counseling. She is here today due to Latch issues. She was referred by her [facility] LC.     OB HISTORY:   Mode of Delivery: Normal vaginal route    Objective:    BREASTFEEDING ASSESSMENT:   Physical Exam    Breast Assessment: Medium, Symmetrical, Full, Soft, Warm, and Compressible  Nipple Assessment/Stage: bruised, erect, creased after feeding, and sore Stage I - Pain or irritation with no skin breakdown  Areola: Normal  Feeding Position: baby - led, cradle, laid back, skin to skin, and both sides  Latch: minimal assistance is needed, shallow latch, deep latch obtained, latch is painful, sucking and swallowing, flanged lips, and correct tongue position  Suck/Feeding: sustained, clenching/biting, baby led rhythmically, and content after feeding  Alternative Feeding Methods: nursing at the breast and paced bottle  Infant Feeding Method: Breast milk only  Infant Behavior: awake, quiet alert, sucking, and content after feeding  Infant Information: Post status frenotomy  Receding chin  Poor occlusion of lips around areola  Good cupping of tongue  Good lateral movement of the tongue  Able to elevate tongue to roof of mouth  Nipple Pain: Pain scale 0-10 (10 most pain): 2-3  Weight: Weight before feedinlbs 0.4oz  Weight after feedinlbs 2.0oz    PATIENT DISCUSSION SUMMARY: Mom here for follow-up weight check and wound check post frenotomy 8 days ago.  Mom latching baby but complaining still of a slight amount of pain and compression stripe.  Baby having a more difficult time on the right breast latch is easier on the left breast.  Encouraged mom to get somebody work the baby appeared really tight.  Mom asked that I do the next stretch when I did the stretch again the wound popped open and cause the slight amount of bleeding .  Mom very frustrated reviewed stretches with her again.  Encouraged mom to call [place] for  a follow-up as well.  Baby gained 1.2 ounces in 2 days and transferred 1.6 ounces today.  Mom did state after the wound deep stretch that it felt so much better and baby transferred better on that side.  Mom to follow-up on Friday for weight check    LACTATION PROBLEMS AND STRATEGIES:  Problems latching baby to breast: Baby should latch to areola (dark area) not just the nipple.  Baby's lips should be flanged outward.  Bring the baby up to the level of your breast by putting a pillow under the baby.  Do not use bottles or pacifiers until latching on well.  Dribble milk over the nipple or express milk so that baby can taste it  Encourage a deeper latch with the asymetrical latch- lining baby's nose opposite mother's nipple.  Encourage nipple to stand up prior to feeing by pumping, nipple rolling or by brief application of ice.  Gently tickle your baby's lip with your nipple to encourage your baby to open his/her mouth wide.  Hold baby so that your breast is positioned deep in the baby's mouth.  Hold your baby close, tummy to tummy.  If baby does not latch to breast, express breast milk.  If engorged, express some milk to soften breast.  If the baby is not latched on well, break seal and gently try again.  Keep baby skin to skin and watch for feeding cues.  Massage breast to start milk-ejection reflex.  Place nipple and at least 1 inch of areola into baby's mouth.  Place your hand behind the baby's neck and shoulder.  Do not force baby's head into breast.  Put baby in the football hold or clutch hold, supporting his/her neck and head in sniffing position to open his/her throat.  Shape breast into oval shape (sandwich hold)  for deep latch.  Try different feeding positions (cradle, football, side etc.).  Use a breast feeding pillow to bring baby up to the level of the breast.  Use nipple shield and monitor baby's weight gain and output.  Use semi-reclined feeding position to allow baby to take an active role and  trigger baby's inborn feeding behavior.  Use your hand to support your breast during feeding.  When your baby's mouth is wide open, quickly pull baby into your breast.  Your baby's chin should be pressed into your breast.  PATIENT INSTRUCTION HANDOUTS GIVEN:      LACTATION EDUCATION:  Correct Positioning and Correct Latch On

## 2025-07-01 ENCOUNTER — LACTATION CONSULT (OUTPATIENT)
Dept: LACTATION | Facility: CLINIC | Age: 35
End: 2025-07-01
Payer: COMMERCIAL

## 2025-07-01 DIAGNOSIS — O92.70 LACTATION PROBLEM (HHS-HCC): Primary | ICD-10-CM

## 2025-07-01 PROCEDURE — 99211 OFF/OP EST MAY X REQ PHY/QHP: CPT | Performed by: EMERGENCY MEDICINE

## 2025-07-02 NOTE — PROGRESS NOTES
Lactation Counseling Note    Subjective:    Sugey Dasilva is a 34 y.o. patient referred for lactation counseling. She is here today due to Latch issues. She was referred by her [facility] LC.     OB HISTORY:   Mode of Delivery: Normal vaginal route    Objective:    BREASTFEEDING ASSESSMENT:   Physical Exam    Breast Assessment: Medium, Symmetrical, Full, Soft, Warm, and Compressible  Nipple Assessment/Stage: intact, short, erect, and rounded after feeding Stage I - Pain or irritation with no skin breakdown  Areola: Normal  Feeding Position: baby - led, cradle, and both sides  Latch: minimal assistance is needed, instructed on deep latch, deep latch obtained, comfortable with no pain, flanged lips, and correct tongue position  Suck/Feeding: sustained, baby led rhythmically, audible swallowing with stimulation, and content after feeding  Alternative Feeding Methods: nursing at the breast  Infant Feeding Method: Breast milk only  Infant Behavior: awake, quiet alert, readiness to feed, and content after feeding  Infant Information: Post status frenotomy  Receding chin  Good cupping of tongue  Good lateral movement of the tongue  Able to elevate tongue to roof of mouth  Nipple Pain: Pain scale 0-10 (10 most pain): 0  Weight: Weight before feedinlbs 7.1  Weight after feedinlbs 8.4  Amount of breast milk transferred: 1.5ml ml  Number of voids in the last 24 hours: 6-8  Number of stools in the last 24 hours: 4    PATIENT DISCUSSION SUMMARY: Here today for follow-up weight check status post frenectomy.  Baby up 7 ounces in the past 7 days mom states he has had a deeper latch since the last visit with the deepest stretch of his frenectomy site.  Mom feels she is doing the stretches much better wound appears to be open and healing appropriately baby latched on mom denies any pain transferred 1.5 ounces.  Mom wants to come early next week for a weight check mom to come Tuesday or Wednesday.    LACTATION PROBLEMS AND  STRATEGIES:  Problems latching baby to breast: Baby should latch to areola (dark area) not just the nipple.  Baby's lips should be flanged outward.  Bring the baby up to the level of your breast by putting a pillow under the baby.  Do not use bottles or pacifiers until latching on well.  Dribble milk over the nipple or express milk so that baby can taste it  Encourage a deeper latch with the asymetrical latch- lining baby's nose opposite mother's nipple.  Encourage nipple to stand up prior to feeing by pumping, nipple rolling or by brief application of ice.  Gently tickle your baby's lip with your nipple to encourage your baby to open his/her mouth wide.  Hold baby so that your breast is positioned deep in the baby's mouth.  Hold your baby close, tummy to tummy.  If baby does not latch to breast, express breast milk.  If engorged, express some milk to soften breast.  If the baby is not latched on well, break seal and gently try again.  Keep baby skin to skin and watch for feeding cues.  Massage breast to start milk-ejection reflex.  Place nipple and at least 1 inch of areola into baby's mouth.  Place your hand behind the baby's neck and shoulder.  Do not force baby's head into breast.  Put baby in the football hold or clutch hold, supporting his/her neck and head in sniffing position to open his/her throat.  Shape breast into oval shape (sandwich hold)  for deep latch.  Try different feeding positions (cradle, football, side etc.).  Use a breast feeding pillow to bring baby up to the level of the breast.  Use nipple shield and monitor baby's weight gain and output.  Use semi-reclined feeding position to allow baby to take an active role and trigger baby's inborn feeding behavior.  Use your hand to support your breast during feeding.  When your baby's mouth is wide open, quickly pull baby into your breast.  Your baby's chin should be pressed into your breast.  PATIENT INSTRUCTION HANDOUTS GIVEN:      LACTATION  EDUCATION:  Correct Positioning and Correct Latch On

## 2025-07-02 NOTE — PATIENT INSTRUCTIONS
Here today for follow-up weight check status post frenectomy.  Baby up 7 ounces in the past 7 days mom states he has had a deeper latch since the last visit with the deepest stretch of his frenectomy site.  Mom feels she is doing the stretches much better wound appears to be open and healing appropriately baby latched on mom denies any pain transferred 1.5 ounces.  Mom wants to come early next week for a weight check mom to come Tuesday or Wednesday.

## 2025-07-09 DIAGNOSIS — R52 PAIN: ICD-10-CM

## 2025-07-09 PROCEDURE — RXMED WILLOW AMBULATORY MEDICATION CHARGE

## 2025-07-09 RX ORDER — ACETAMINOPHEN 500 MG
1000 TABLET ORAL EVERY 6 HOURS PRN
Qty: 30 TABLET | Refills: 0 | Status: CANCELLED | OUTPATIENT
Start: 2025-07-09

## 2025-07-09 RX ORDER — ACETAMINOPHEN 500 MG
1000 TABLET ORAL EVERY 6 HOURS PRN
Qty: 30 TABLET | Refills: 0 | OUTPATIENT
Start: 2025-07-09

## 2025-07-10 ENCOUNTER — PHARMACY VISIT (OUTPATIENT)
Dept: PHARMACY | Facility: CLINIC | Age: 35
End: 2025-07-10
Payer: COMMERCIAL

## 2025-07-10 ENCOUNTER — TELEPHONE (OUTPATIENT)
Dept: OBSTETRICS AND GYNECOLOGY | Facility: CLINIC | Age: 35
End: 2025-07-10
Payer: COMMERCIAL

## 2025-07-10 NOTE — TELEPHONE ENCOUNTER
"Returned pt call.  She is experiencing some discomfort in the area of repair after \"scooting off the bed\" and a brief episode of constipation.  The discomfort is not increased from what she has been experiencing, although she states she is probably more aware of it now.  No other symptoms.  Has not taking any OTC pain meds recently.  Advised she could try advil or tylenol.  She will continue sitz baths, take OTC pain relief to see if it helps, continue colace or miralax, and use care when getting out of bed, etc.  She will hold off on being seen sooner, and will call back if she changes her mind.  Appt is scheduled in 2 weeks for follow up.  She was grateful for the call.  "

## 2025-07-10 NOTE — TELEPHONE ENCOUNTER
Patient called stating that she was seen with Dr. Man last week for 3rd degree tears and is currently experiencing some pain and discomfort. She would like to have someone contact her to discuss her options.

## 2025-07-15 ENCOUNTER — TELEPHONE (OUTPATIENT)
Dept: OBSTETRICS AND GYNECOLOGY | Facility: CLINIC | Age: 35
End: 2025-07-15
Payer: COMMERCIAL

## 2025-07-15 NOTE — TELEPHONE ENCOUNTER
Spoke to pt and advised her to let her know that we have no earlier appt but if is concerned to call Dr. Morrison's office and see if they can help with appt.

## 2025-07-21 ENCOUNTER — APPOINTMENT (OUTPATIENT)
Dept: OBSTETRICS AND GYNECOLOGY | Facility: CLINIC | Age: 35
End: 2025-07-21
Payer: COMMERCIAL

## 2025-07-21 VITALS
HEIGHT: 60 IN | DIASTOLIC BLOOD PRESSURE: 70 MMHG | SYSTOLIC BLOOD PRESSURE: 128 MMHG | WEIGHT: 145 LBS | BODY MASS INDEX: 28.47 KG/M2

## 2025-07-21 PROCEDURE — 99214 OFFICE O/P EST MOD 30 MIN: CPT | Performed by: STUDENT IN AN ORGANIZED HEALTH CARE EDUCATION/TRAINING PROGRAM

## 2025-07-21 PROCEDURE — 1036F TOBACCO NON-USER: CPT | Performed by: STUDENT IN AN ORGANIZED HEALTH CARE EDUCATION/TRAINING PROGRAM

## 2025-07-21 PROCEDURE — 3008F BODY MASS INDEX DOCD: CPT | Performed by: STUDENT IN AN ORGANIZED HEALTH CARE EDUCATION/TRAINING PROGRAM

## 2025-07-21 ASSESSMENT — PAIN SCALES - GENERAL: PAINLEVEL_OUTOF10: 0-NO PAIN

## 2025-07-21 NOTE — PROGRESS NOTES
HISTORY OF PRESENT ILLNESS:  Sugey Dasilva is a 34 y.o. female, who presents in follow up for 3c perineal laceration.    During last encounter on 6/23/25, reviewed and agreed to the following:  Sugey Dasilva is a 34 y.o. year old, here for evaluation for 3c perineal laceration     3c degree laceration  - we reviewed the pathology and natural history of OASI, specifically increased risk for wound complications and pelvic floor dysfunction/bowel control difficulty later in life  - reviewed purpose of [facility]AD clinic to closely monitor wound healing and identify issues early before they progress  - reviewed recommendations for bowel regimen, titrating to daily soft BM  - discussed sitz bath strategy (one inch of plain water in tub/cookie sheet 5 minutes TID)  - healing appropriately on exam today  - discussed plan for follow up, anticipate next visit 4 weeks and likely cleared from further evaluation at that time if no wound healing issues identified prior to then  - reviewed warning signs: worsening pain, foul smelling discharge, seth fecal incontinence, etc    PFPT recommendation discussed and referral placed today. Has previously done PFPT but was in [place], not very convenient location for her. Amenable to new referral.     All questions and concerns were answered and addressed.  The patient expressed understanding and agrees with the plan.      Mira Man MD    Today she reports   Left side near rectum, hurts from sitting to standing position or opening legs up  Strained once for bowel movement and scooted off bed once and then started hurting.    600 mg ibuprofen and sitz baths help with pain    No vaginal discharge or any bleeding  No vaginal or rectal pain    Still doing Miralax and helping to avoid straining.    No fever, hasn't been able to see own incision      The following were reviewed to gain additional history:  External notes:   Test results:           PHYSICAL EXAMINATION:  Patient's  last menstrual period was 2023 (exact date).  Body mass index is 28.32 kg/m².  /70   Ht (!) 1.524 m (5')   Wt 65.8 kg (145 lb)   LMP 2023 (Exact Date)   Breastfeeding Yes   BMI 28.32 kg/m²     General Appearance: well appearing  Neuro: Alert and oriented   HEENT: mucous membranes moist, neck supple  Resp: No respiratory distress, normal work of breathing  MSK: normal range of motion, gait appropriate    Pelvic:  Chaperone for pelvic exam:   Genitourinary:  normal external genitalia, Bartholin's glands, Aztec's glands negative,   Urethra normal meatus, non-tender, no periurethral mass  Vaginal mucosa normal  Atrophy negative    Perineum well healed, no residual suture material, nontender to palpation, no discharge or drainage  PDS suture tail noted at midline perineum approx 1 cm from anal verge approx 3 mm in length    Rectal: no hemorrhoids, fissures or masses.    PVR (by ultrasound):       IMPRESSION AND PLAN:  Sugey Dasilva is a 34 y.o. who presents in follow up for 3c perineal laceration    3c perineal laceration  - well healed on exam today   - suture borderline short for trimming but correlates with her area of discomfort. Recommend awaiting healing but if it becomes more bothersome, call back to present for suture trimming (or could potentially have primary OB do this as well)  - cleared from all pelvic restrictions, ok to start PFPT, ok to resume intercourse   -  counseled on delivery planning for future deliveries and risks/benefits of vaginal versus  delivery. Reviewed if any issues with bowel control prior to next pregnancy would likely recommend  delivery.    All questions and concerns were answered and addressed.  The patient expressed understanding and agrees with the plan.     RTC as needed    Mira Man MD

## 2025-07-30 ENCOUNTER — EVALUATION (OUTPATIENT)
Dept: PHYSICAL THERAPY | Facility: CLINIC | Age: 35
End: 2025-07-30
Payer: COMMERCIAL

## 2025-07-30 PROCEDURE — 97161 PT EVAL LOW COMPLEX 20 MIN: CPT | Mod: GP | Performed by: PHYSICAL THERAPIST

## 2025-07-30 PROCEDURE — 97110 THERAPEUTIC EXERCISES: CPT | Mod: GP | Performed by: PHYSICAL THERAPIST

## 2025-07-30 NOTE — PROGRESS NOTES
Physical Therapy Evaluation and Treatment      Patient Name: Sugey Dasilva  MRN: [id number]  Today's Date: 7/30/2025  Time Calculation  Start Time: 1108  Stop Time: 1210  Time Calculation (min): 62 min      PT Evaluation Time Entry  PT Evaluation (Low) Time Entry: 30  PT Therapeutic Procedures Time Entry  Therapeutic Exercise Time Entry: 25                   INSURANCE:  Visit number: 1/4  DEDE BMN PT OT COPAY 0 DED 3300(1800) 3600(2796)  COVERAGE 100 OOP 9000(9093) NO AUTH REQ  REF# [id number]     Referring Provider: Mira Man MD  Hx: Hyperthyroid, PCOS    ASSESSMENT:  PT Assessment Results: decreased knowledge of HEP, activity limitations, ADLs/IADLs/self care skills, flexibility, motor function/control/tone, pain, participation restrictions, range of motion/joint mobility, strength, posture, transfers, coordination, balance, gait/locomotion, fall risk, decreased knowledge of assisted device use, integumentary, decreased knowledge of precautions.  Rehab Prognosis: Good  Evaluation/Treatment Tolerance: Patient tolerated treatment well  Stable and/or uncomplicated characteristics    Sugey Dasilva is a 34 y.o. female presenting to the clinic with pelvic pain after a perineal laceration during childbirth. Pt demonstrates decreased ROM and strength of the pelvis/B hips and abdominals causing pain and dysfunction with toileting, STSs, scooting on bed, and squatting. Pt was given and reviewed HEP. The patient was educated on the importance of general therapeutic exercise, anatomy, function, & likely cause of impairments. Pt will benefit from skilled PT in order to increase ROM and strength of the pelvis/B hips and abdominals so that the pt can perform ADLs without pain and return to PLOF.     PLAN:  Treatments/Interventions: traction, gait training, dry needling, edema control, education/instruction, home program, self care/home management, manual therapy, neuromuscular re-education,  therapeutic activities, therapeutic exercises, modalities, therapeutic elastic taping.   PT Plan: Skilled PT  PT Frequency: 1 time per week  Duration: 6 visits  Onset Date: 06/14/25  Rehab Potential: Good  Plan of Care Agreement: Patient    Goals -    Pt will report less than a 0-1/10 pain at the worst.  Pt will be able to manage changes in intra-abdominal pressure with appropriate pelvic floor and TA muscle activation.  Pt will be able to coordinate pelvic floor with thoracic diaphragm during functional activities that cause symptoms.  Pt will have at least 4+/5 to 5/5 strength for B hips.   Pt will have AROM to WFL for the lumbar spine.   Pt will report a significant improvement with Female NIH-CPSI score by 5 points (MDC).  Pt will be independent with progressed HEP.    CURRENT PROBLEM:   1. Type 3c perineal laceration ([facility])  Follow Up In Physical Therapy          Subjective      PELVIC HISTORY:  History of Current Episode/Chief Complaint -  Pt reports that she had her baby boy vaginally on 6/14/25 where she sustained a 3c perineal laceration. Pt reports that she was having some pain due to the tail of a stitch pulling. Pt reports that she now has the same sensation on the other side. Pt feels it the most with STSs and scooting on the bed.    Date Onset - 6/14/25    Mechanism of Injury -  Insidious Onset/Childbirth    Pelvic Pain -   Pain when emptying bladder: No  Pain with wiping or tight clothing: Sometimes with tight clothing  Pain with intercourse: Not tried yet, would anticipate it being painful  History of back pain: Yes, sciatica    Pain Location: Vaginal, Lumbar  Pain Today: 2/10  Pain Worst: 2/10  Pain Type: Intermittent, Achy/Dull, Pulling, Stiffness/Tightness  Pain Exacerbating Factors: STSs, scooting on bed, and squatting.  Pain Relieving Factors: Rest, Tylenol/Ibuprofen, Sitz bath  Difficulty Sleeping: No  Night Sweats, Loss of Appetite, Unexpected Weight-loss: No  Saddle Anesthesia:  No    Pelvic Exercise -   Do you do Kegels? No   Current exercise regime: Walking    Bladder Hx -   Excessive Urinary Urgency: None  Water Consumption a day: 90 oz due to breast feeding  Daytime Voiding Frequency: 6 times  Nighttime Voiding Frequency: 1 time  Unintentional urine loss frequency: None  Difficulty initiating flow of urine: No  Slow/weak urine stream: No  Do you push to urinate: No  Able to completely empty bladder: Yes    Bowel Hx -   Excessive Bowel Urgency: Yes   BM Frequency: 1-2 times a day  Frequent Diarrhea: No  Frequent constipation/straining/incomplete emptying: No  Supplements: Miralax  Unintentional stool loss: No    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  Pt lives with  and her child.    Patient Stated Goals -   Re-strengthen pelvic floor and abdominals  Reduce pain    PRECAUTIONS -    None    Prior Level of Function -    I with ADLs/IADLs     Objective     Hip AROM (degrees) - WFL grossly except slight IR and extension    Hip MMT (/5) -  R hip Flexion: 4+   R hip ER: 4   R hip IR: 4   R hip Abduction: 4   L hip Flexion: 4+    L hip ER: 4-   L hip IR: 4   L hip Abduction: 4     Lumbar AROM -   Lumbar Flexion: 80%  Lumbar Extension: 20%    Posture -   Increased Lumbar Lordosis/APT    Functional Assessment/Special Tests -  Trendelenburg positive bilateral  Breathing: Upper chest, edu on  MARBELLA negative bilateral  Connor positive bilateral    Palpation - Consent to External Palpation Verbally Given  Diastasis Recti: 0.5 finger length at Umbilicus, 0 finger length 1” below, 0 finger length 1” above  Pelvic Alignment: R anteriorly rotated innominate - corrected with Shotgun/MET    Internal Palpation Exam - Plan to perform next visit with patient consent    Outcome Measures -   Other Measures  Other Outcome Measures: Female NIH-CPSI: 11 (Pain 4, Urinary 0, QOL 7)     Treatment -   Evaluation - Low  (40865)    Therapeutic Exercise (87385) -     Diaphragmatic Breathing Grounding   Bridge on Heels   Cat Exhale, Cow Inhale  Supine Pelvic Floor Stretch   Seated Happy Baby  Elimination Position Handout   EAS/RAIR Retraining Handout    OP Patient Education -   Access Code: [email or web address]  URL: [email or web address]  Date: 07/30/2025  Prepared by: Fifi Arana    Exercises  - Diaphragmatic Breathing Grounding   - Bridge on Heels  - 1-2 x daily - 3 sets - 10 reps - 2 sec hold  - Cat Exhale, Cow Inhale  - 1-2 x daily - 2 sets - 10 reps  - Supine Pelvic Floor Stretch  - 1-2 x daily - 1-2 min hold  - Seated Happy Baby With Trunk Flexion For Pelvic Relaxation  - 1-2 x daily - 1-2 min hold    Handouts  - Elimination Position Handout   - EAS/RAIR Retraining Handout

## 2025-08-04 ENCOUNTER — APPOINTMENT (OUTPATIENT)
Dept: OBSTETRICS AND GYNECOLOGY | Facility: CLINIC | Age: 35
End: 2025-08-04
Payer: COMMERCIAL

## 2025-08-04 VITALS
SYSTOLIC BLOOD PRESSURE: 121 MMHG | WEIGHT: 146.4 LBS | HEIGHT: 60 IN | BODY MASS INDEX: 28.74 KG/M2 | DIASTOLIC BLOOD PRESSURE: 79 MMHG

## 2025-08-04 DIAGNOSIS — R87.622 LOW GRADE SQUAMOUS INTRAEPITHELIAL LESION (LGSIL) ON PAPANICOLAOU SMEAR OF VAGINA WITH POSITIVE (HPV) DNA TEST: ICD-10-CM

## 2025-08-04 DIAGNOSIS — R87.89 LOW GRADE SQUAMOUS INTRAEPITHELIAL LESION (LGSIL) ON PAPANICOLAOU SMEAR OF VAGINA WITH POSITIVE (HPV) DNA TEST: ICD-10-CM

## 2025-08-04 PROCEDURE — 87626 HPV SEP HI-RSK TYP&POOL RSLT: CPT

## 2025-08-04 PROCEDURE — 0503F POSTPARTUM CARE VISIT: CPT | Performed by: OBSTETRICS & GYNECOLOGY

## 2025-08-04 ASSESSMENT — EDINBURGH POSTNATAL DEPRESSION SCALE (EPDS)
I HAVE BEEN ANXIOUS OR WORRIED FOR NO GOOD REASON: YES, SOMETIMES
I HAVE BLAMED MYSELF UNNECESSARILY WHEN THINGS WENT WRONG: NO, NEVER
THE THOUGHT OF HARMING MYSELF HAS OCCURRED TO ME: NEVER
I HAVE FELT SAD OR MISERABLE: NO, NOT AT ALL
THINGS HAVE BEEN GETTING ON TOP OF ME: NO, MOST OF THE TIME I HAVE COPED QUITE WELL
I HAVE BEEN SO UNHAPPY THAT I HAVE BEEN CRYING: NO, NEVER
I HAVE FELT SCARED OR PANICKY FOR NO GOOD REASON: NO, NOT MUCH
I HAVE LOOKED FORWARD WITH ENJOYMENT TO THINGS: AS MUCH AS I EVER DID
TOTAL SCORE: 4
I HAVE BEEN SO UNHAPPY THAT I HAVE HAD DIFFICULTY SLEEPING: NOT AT ALL
I HAVE BEEN ABLE TO LAUGH AND SEE THE FUNNY SIDE OF THINGS: AS MUCH AS I ALWAYS COULD

## 2025-08-04 NOTE — PROGRESS NOTES
Postpartum Visit    HPI:  Pt presents for her 6 week postpartum visit s/p  on 25 complicated by 3c perineal laceration.  She had a baby boy.  She is Breast and bottle feeding. Overall doing well. Saw urogynecology and is doing PFPT.  No fecal/flatal incontinence and no pain.     ROS:  Denies the following: Complains of the following:    - episiotomy site pain   - incisional pain  - incisional drainage  - heavy bleeding  - irregular bleeding  - breast pain  - cracked nipples  - breastfeeding problems  - abdominal pain  - shortness of breath  - chest pain  - leg pain  - depression  - suicidal ideation  - nausea  - vomiting  - fever  - pain with urination  - tiredness or fatigue  - headache - vaginal discharge  - back pain       O:  /79 (BP Location: Right arm, Patient Position: Sitting, BP Cuff Size: Adult)   Ht (!) 1.524 m (5')   Wt 66.4 kg (146 lb 6.4 oz)   LMP 2023 (Exact Date)   Breastfeeding Yes   BMI 28.59 kg/m²     General Appearance   - consistent with stated age, well groomed and cooperative    Integumentary  - skin warm and dry without rash    Head and Neck  - normalocephalic and neck supple    Female Genitourinary  - vulva normal without rash or lesion, normal vaginal rugae, no vaginal discharge, uterus normal size & no palpable masses, no adnexal mass, no adnexal tenderness, no cervical motion tenderness, pap done, 3c perineal laceration well healed    Peripheral Vascular  - no edema present    A/P:   Pt is a 34 y.o.  who presents for 6 week postpartum visit.  Doing well overall postpartum.  Coping well with new baby at home.  Shannon  Depression Scale Total: 4.  Breastfeeding going well.  . Baby doing well.  Contraception discussed - declines. Plans to use withdrawal/condoms. Pap done today. f/u for annual exam or as needed.  Discussed JENAE in next pregnancy and recurrance risk of OASIS injuries, likely plans vaginal delivery in future as has no long term issues  at this point.  RTC one year or earlier with any concerns.    Mayela Morrison MD

## 2025-08-14 ENCOUNTER — TREATMENT (OUTPATIENT)
Dept: PHYSICAL THERAPY | Facility: CLINIC | Age: 35
End: 2025-08-14
Payer: COMMERCIAL

## 2025-08-14 PROCEDURE — 97110 THERAPEUTIC EXERCISES: CPT | Mod: GP | Performed by: PHYSICAL THERAPIST

## 2025-08-14 PROCEDURE — 97116 GAIT TRAINING THERAPY: CPT | Mod: GP | Performed by: PHYSICAL THERAPIST

## 2025-08-20 LAB
CYTOLOGY CMNT CVX/VAG CYTO-IMP: NORMAL
HPV HR 12 DNA GENITAL QL NAA+PROBE: POSITIVE
HPV HR GENOTYPES PNL CVX NAA+PROBE: POSITIVE
HPV16 DNA SPEC QL NAA+PROBE: NEGATIVE
HPV18 DNA SPEC QL NAA+PROBE: NEGATIVE
LAB AP HPV GENOTYPE QUESTION: YES
LAB AP HPV HR: NORMAL
LAB AP PREVIOUS ABNORMAL HISTORY: NORMAL
LAB AP TREATMENT HISTORY: NORMAL
LABORATORY COMMENT REPORT: NORMAL
PATH REPORT.TOTAL CANCER: NORMAL

## 2025-08-21 ENCOUNTER — TREATMENT (OUTPATIENT)
Dept: PHYSICAL THERAPY | Facility: CLINIC | Age: 35
End: 2025-08-21
Payer: COMMERCIAL

## 2025-08-21 PROCEDURE — 97140 MANUAL THERAPY 1/> REGIONS: CPT | Mod: GP | Performed by: PHYSICAL THERAPIST

## 2025-08-21 PROCEDURE — 97112 NEUROMUSCULAR REEDUCATION: CPT | Mod: GP | Performed by: PHYSICAL THERAPIST

## 2025-08-21 PROCEDURE — 97110 THERAPEUTIC EXERCISES: CPT | Mod: GP | Performed by: PHYSICAL THERAPIST

## 2025-08-28 ENCOUNTER — TREATMENT (OUTPATIENT)
Dept: PHYSICAL THERAPY | Facility: CLINIC | Age: 35
End: 2025-08-28
Payer: COMMERCIAL

## 2025-08-28 PROCEDURE — 97140 MANUAL THERAPY 1/> REGIONS: CPT | Mod: GP | Performed by: PHYSICAL THERAPIST

## 2025-08-28 PROCEDURE — 97110 THERAPEUTIC EXERCISES: CPT | Mod: GP | Performed by: PHYSICAL THERAPIST

## 2025-08-28 PROCEDURE — 97112 NEUROMUSCULAR REEDUCATION: CPT | Mod: GP | Performed by: PHYSICAL THERAPIST

## 2025-09-04 ENCOUNTER — TREATMENT (OUTPATIENT)
Dept: PHYSICAL THERAPY | Facility: CLINIC | Age: 35
End: 2025-09-04
Payer: COMMERCIAL

## 2025-09-04 PROCEDURE — 97110 THERAPEUTIC EXERCISES: CPT | Mod: GP | Performed by: PHYSICAL THERAPIST

## 2025-09-04 PROCEDURE — 97140 MANUAL THERAPY 1/> REGIONS: CPT | Mod: GP | Performed by: PHYSICAL THERAPIST

## 2025-10-13 ENCOUNTER — APPOINTMENT (OUTPATIENT)
Dept: OBSTETRICS AND GYNECOLOGY | Facility: CLINIC | Age: 35
End: 2025-10-13
Payer: COMMERCIAL

## 2026-08-18 ENCOUNTER — APPOINTMENT (OUTPATIENT)
Dept: OBSTETRICS AND GYNECOLOGY | Facility: CLINIC | Age: 36
End: 2026-08-18
Payer: COMMERCIAL